# Patient Record
Sex: FEMALE | Race: WHITE | URBAN - METROPOLITAN AREA
[De-identification: names, ages, dates, MRNs, and addresses within clinical notes are randomized per-mention and may not be internally consistent; named-entity substitution may affect disease eponyms.]

---

## 2020-02-10 ENCOUNTER — OFFICE VISIT CONVERTED (OUTPATIENT)
Dept: PODIATRY | Facility: CLINIC | Age: 85
End: 2020-02-10
Attending: PODIATRIST

## 2020-04-06 ENCOUNTER — HOSPITAL ENCOUNTER (OUTPATIENT)
Dept: OTHER | Facility: HOSPITAL | Age: 85
Discharge: HOME | End: 2020-04-06
Attending: INTERNAL MEDICINE

## 2020-04-06 LAB
ALBUMIN SERPL-MCNC: 3.9 G/DL (ref 3.5–5)
ALBUMIN/GLOB SERPL: 1.2 {RATIO} (ref 1.4–2.6)
ALP SERPL-CCNC: 69 U/L (ref 43–160)
ALT SERPL-CCNC: 20 U/L (ref 10–40)
ANION GAP SERPL CALC-SCNC: 17 MMOL/L (ref 8–19)
AST SERPL-CCNC: 15 U/L (ref 15–50)
BASOPHILS # BLD AUTO: 0.08 10*3/UL (ref 0–0.2)
BASOPHILS NFR BLD AUTO: 0.6 % (ref 0–3)
BILIRUB SERPL-MCNC: 0.35 MG/DL (ref 0.2–1.3)
BUN SERPL-MCNC: 18 MG/DL (ref 5–25)
BUN/CREAT SERPL: 35 {RATIO} (ref 6–20)
CALCIUM SERPL-MCNC: 9.4 MG/DL (ref 8.7–10.4)
CHLORIDE SERPL-SCNC: 101 MMOL/L (ref 99–111)
CHOLEST SERPL-MCNC: 135 MG/DL (ref 107–200)
CHOLEST/HDLC SERPL: 5.4 {RATIO} (ref 3–6)
CONV ABS IMM GRAN: 0.08 10*3/UL (ref 0–0.2)
CONV CO2: 24 MMOL/L (ref 22–32)
CONV IMMATURE GRAN: 0.6 % (ref 0–1.8)
CONV TOTAL PROTEIN: 7.1 G/DL (ref 6.3–8.2)
CREAT UR-MCNC: 0.51 MG/DL (ref 0.5–0.9)
DEPRECATED RDW RBC AUTO: 44.9 FL (ref 36.4–46.3)
EOSINOPHIL # BLD AUTO: 0.21 10*3/UL (ref 0–0.7)
EOSINOPHIL # BLD AUTO: 1.6 % (ref 0–7)
ERYTHROCYTE [DISTWIDTH] IN BLOOD BY AUTOMATED COUNT: 13.5 % (ref 11.7–14.4)
EST. AVERAGE GLUCOSE BLD GHB EST-MCNC: 200 MG/DL
GFR SERPLBLD BASED ON 1.73 SQ M-ARVRAT: >60 ML/MIN/{1.73_M2}
GLOBULIN UR ELPH-MCNC: 3.2 G/DL (ref 2–3.5)
GLUCOSE SERPL-MCNC: 196 MG/DL (ref 65–99)
HBA1C MFR BLD: 8.6 % (ref 3.5–5.7)
HCT VFR BLD AUTO: 43.4 % (ref 37–47)
HDLC SERPL-MCNC: 25 MG/DL (ref 40–60)
HGB BLD-MCNC: 14 G/DL (ref 12–16)
LDLC SERPL CALC-MCNC: 45 MG/DL (ref 70–100)
LYMPHOCYTES # BLD AUTO: 5.33 10*3/UL (ref 1–5)
LYMPHOCYTES NFR BLD AUTO: 40.5 % (ref 20–45)
MCH RBC QN AUTO: 29.9 PG (ref 27–31)
MCHC RBC AUTO-ENTMCNC: 32.3 G/DL (ref 33–37)
MCV RBC AUTO: 92.5 FL (ref 81–99)
MONOCYTES # BLD AUTO: 1.05 10*3/UL (ref 0.2–1.2)
MONOCYTES NFR BLD AUTO: 8 % (ref 3–10)
NEUTROPHILS # BLD AUTO: 6.4 10*3/UL (ref 2–8)
NEUTROPHILS NFR BLD AUTO: 48.7 % (ref 30–85)
NRBC CBCN: 0 % (ref 0–0.7)
OSMOLALITY SERPL CALC.SUM OF ELEC: 293 MOSM/KG (ref 273–304)
PLATELET # BLD AUTO: 310 10*3/UL (ref 130–400)
PMV BLD AUTO: 10.4 FL (ref 9.4–12.3)
POTASSIUM SERPL-SCNC: 4.2 MMOL/L (ref 3.5–5.3)
RBC # BLD AUTO: 4.69 10*6/UL (ref 4.2–5.4)
SODIUM SERPL-SCNC: 138 MMOL/L (ref 135–147)
TRIGL SERPL-MCNC: 326 MG/DL (ref 40–150)
TSH SERPL-ACNC: 0.04 M[IU]/L (ref 0.27–4.2)
VLDLC SERPL-MCNC: 65 MG/DL (ref 5–37)
WBC # BLD AUTO: 13.15 10*3/UL (ref 4.8–10.8)

## 2021-05-15 VITALS
HEIGHT: 63 IN | SYSTOLIC BLOOD PRESSURE: 136 MMHG | DIASTOLIC BLOOD PRESSURE: 64 MMHG | BODY MASS INDEX: 24.63 KG/M2 | OXYGEN SATURATION: 93 % | WEIGHT: 139 LBS | HEART RATE: 66 BPM

## 2023-06-08 ENCOUNTER — HOSPITAL ENCOUNTER (EMERGENCY)
Facility: HOSPITAL | Age: 88
Discharge: HOME | End: 2023-06-08
Attending: EMERGENCY MEDICINE
Payer: MEDICARE

## 2023-06-08 ENCOUNTER — APPOINTMENT (OUTPATIENT)
Dept: GENERAL RADIOLOGY | Facility: HOSPITAL | Age: 88
End: 2023-06-08
Payer: MEDICARE

## 2023-06-08 ENCOUNTER — APPOINTMENT (OUTPATIENT)
Dept: CT IMAGING | Facility: HOSPITAL | Age: 88
End: 2023-06-08
Payer: MEDICARE

## 2023-06-08 VITALS
HEART RATE: 51 BPM | TEMPERATURE: 98 F | SYSTOLIC BLOOD PRESSURE: 168 MMHG | WEIGHT: 147.49 LBS | HEIGHT: 63 IN | RESPIRATION RATE: 24 BRPM | DIASTOLIC BLOOD PRESSURE: 67 MMHG | OXYGEN SATURATION: 96 % | BODY MASS INDEX: 26.13 KG/M2
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
## 2023-06-08 LAB
ALBUMIN SERPL-MCNC: 3.4 G/DL (ref 3.5–5.2)
ALBUMIN/GLOB SERPL: 1.2 G/DL
ALP SERPL-CCNC: 79 U/L (ref 39–117)
ALT SERPL W P-5'-P-CCNC: 10 U/L (ref 1–33)
ANION GAP SERPL CALCULATED.3IONS-SCNC: 9.1 MMOL/L (ref 5–15)
AST SERPL-CCNC: 13 U/L (ref 1–32)
BASOPHILS # BLD AUTO: 0.08 10*3/MM3 (ref 0–0.2)
BASOPHILS NFR BLD AUTO: 0.5 % (ref 0–1.5)
BILIRUB SERPL-MCNC: 0.4 MG/DL (ref 0–1.2)
BILIRUB UR QL STRIP: NEGATIVE
BUN SERPL-MCNC: 8 MG/DL (ref 8–23)
BUN/CREAT SERPL: 17 (ref 7–25)
CALCIUM SPEC-SCNC: 8.2 MG/DL (ref 8.2–9.6)
CHLORIDE SERPL-SCNC: 105 MMOL/L (ref 98–107)
CK SERPL-CCNC: 20 U/L (ref 20–180)
CLARITY UR: CLEAR
CO2 SERPL-SCNC: 27.9 MMOL/L (ref 22–29)
COLOR UR: YELLOW
CREAT SERPL-MCNC: 0.47 MG/DL (ref 0.57–1)
D-LACTATE SERPL-SCNC: 1.7 MMOL/L (ref 0.5–2)
DEPRECATED RDW RBC AUTO: 44.6 FL (ref 37–54)
EGFRCR SERPLBLD CKD-EPI 2021: 90 ML/MIN/1.73
EOSINOPHIL # BLD AUTO: 0.15 10*3/MM3 (ref 0–0.4)
EOSINOPHIL NFR BLD AUTO: 0.9 % (ref 0.3–6.2)
ERYTHROCYTE [DISTWIDTH] IN BLOOD BY AUTOMATED COUNT: 14.2 % (ref 12.3–15.4)
GLOBULIN UR ELPH-MCNC: 2.9 GM/DL
GLUCOSE SERPL-MCNC: 183 MG/DL (ref 65–99)
GLUCOSE UR STRIP-MCNC: ABNORMAL MG/DL
HCT VFR BLD AUTO: 44.7 % (ref 34–46.6)
HGB BLD-MCNC: 14.8 G/DL (ref 12–15.9)
HGB UR QL STRIP.AUTO: NEGATIVE
IMM GRANULOCYTES # BLD AUTO: 0.12 10*3/MM3 (ref 0–0.05)
IMM GRANULOCYTES NFR BLD AUTO: 0.7 % (ref 0–0.5)
KETONES UR QL STRIP: NEGATIVE
LEUKOCYTE ESTERASE UR QL STRIP.AUTO: NEGATIVE
LIPASE SERPL-CCNC: 16 U/L (ref 13–60)
LYMPHOCYTES # BLD AUTO: 3.82 10*3/MM3 (ref 0.7–3.1)
LYMPHOCYTES NFR BLD AUTO: 22.5 % (ref 19.6–45.3)
MAGNESIUM SERPL-MCNC: 1.7 MG/DL (ref 1.7–2.3)
MCH RBC QN AUTO: 28.6 PG (ref 26.6–33)
MCHC RBC AUTO-ENTMCNC: 33.1 G/DL (ref 31.5–35.7)
MCV RBC AUTO: 86.3 FL (ref 79–97)
MONOCYTES # BLD AUTO: 1.17 10*3/MM3 (ref 0.1–0.9)
MONOCYTES NFR BLD AUTO: 6.9 % (ref 5–12)
NEUTROPHILS NFR BLD AUTO: 11.65 10*3/MM3 (ref 1.7–7)
NEUTROPHILS NFR BLD AUTO: 68.5 % (ref 42.7–76)
NITRITE UR QL STRIP: NEGATIVE
NRBC BLD AUTO-RTO: 0 /100 WBC (ref 0–0.2)
PH UR STRIP.AUTO: 6.5 [PH] (ref 5–8)
PLATELET # BLD AUTO: 289 10*3/MM3 (ref 140–450)
PMV BLD AUTO: 10.1 FL (ref 6–12)
POTASSIUM SERPL-SCNC: 3.8 MMOL/L (ref 3.5–5.2)
PROT SERPL-MCNC: 6.3 G/DL (ref 6–8.5)
PROT UR QL STRIP: NEGATIVE
RBC # BLD AUTO: 5.18 10*6/MM3 (ref 3.77–5.28)
SODIUM SERPL-SCNC: 142 MMOL/L (ref 136–145)
SP GR UR STRIP: 1.01 (ref 1–1.03)
T4 FREE SERPL-MCNC: 1.38 NG/DL (ref 0.93–1.7)
TROPONIN T SERPL HS-MCNC: 8 NG/L
TSH SERPL DL<=0.05 MIU/L-ACNC: 0.14 UIU/ML (ref 0.27–4.2)
UROBILINOGEN UR QL STRIP: ABNORMAL
WBC NRBC COR # BLD: 16.99 10*3/MM3 (ref 3.4–10.8)

## 2023-06-08 PROCEDURE — 81003 URINALYSIS AUTO W/O SCOPE: CPT | Performed by: EMERGENCY MEDICINE

## 2023-06-08 PROCEDURE — 93010 ELECTROCARDIOGRAM REPORT: CPT | Performed by: INTERNAL MEDICINE

## 2023-06-08 PROCEDURE — 85025 COMPLETE CBC W/AUTO DIFF WBC: CPT | Performed by: EMERGENCY MEDICINE

## 2023-06-08 PROCEDURE — 84439 ASSAY OF FREE THYROXINE: CPT | Performed by: EMERGENCY MEDICINE

## 2023-06-08 PROCEDURE — 70450 CT HEAD/BRAIN W/O DYE: CPT

## 2023-06-08 PROCEDURE — 83690 ASSAY OF LIPASE: CPT | Performed by: EMERGENCY MEDICINE

## 2023-06-08 PROCEDURE — 93005 ELECTROCARDIOGRAM TRACING: CPT | Performed by: EMERGENCY MEDICINE

## 2023-06-08 PROCEDURE — 83735 ASSAY OF MAGNESIUM: CPT | Performed by: EMERGENCY MEDICINE

## 2023-06-08 PROCEDURE — 73502 X-RAY EXAM HIP UNI 2-3 VIEWS: CPT

## 2023-06-08 PROCEDURE — 84484 ASSAY OF TROPONIN QUANT: CPT | Performed by: EMERGENCY MEDICINE

## 2023-06-08 PROCEDURE — 80053 COMPREHEN METABOLIC PANEL: CPT | Performed by: EMERGENCY MEDICINE

## 2023-06-08 PROCEDURE — 99283 EMERGENCY DEPT VISIT LOW MDM: CPT

## 2023-06-08 PROCEDURE — 82550 ASSAY OF CK (CPK): CPT | Performed by: EMERGENCY MEDICINE

## 2023-06-08 PROCEDURE — 83605 ASSAY OF LACTIC ACID: CPT | Performed by: EMERGENCY MEDICINE

## 2023-06-08 PROCEDURE — 84443 ASSAY THYROID STIM HORMONE: CPT | Performed by: EMERGENCY MEDICINE

## 2023-06-08 PROCEDURE — 71045 X-RAY EXAM CHEST 1 VIEW: CPT

## 2023-06-08 PROCEDURE — 36415 COLL VENOUS BLD VENIPUNCTURE: CPT

## 2023-06-08 RX ORDER — APIXABAN 2.5 MG/1
TABLET, FILM COATED ORAL
COMMUNITY

## 2023-06-08 RX ORDER — LOSARTAN POTASSIUM 50 MG/1
TABLET ORAL
COMMUNITY

## 2023-06-08 RX ORDER — BUSPIRONE HYDROCHLORIDE 5 MG/1
TABLET ORAL
COMMUNITY

## 2023-06-08 RX ORDER — AMLODIPINE BESYLATE 5 MG/1
TABLET ORAL
COMMUNITY

## 2023-06-08 RX ORDER — HYDROXYZINE PAMOATE 50 MG/1
CAPSULE ORAL
COMMUNITY
Start: 2023-05-15

## 2023-06-08 RX ORDER — ROSUVASTATIN CALCIUM 10 MG/1
TABLET, COATED ORAL
COMMUNITY
Start: 2023-04-06

## 2023-06-08 RX ORDER — TRAZODONE HYDROCHLORIDE 100 MG/1
TABLET ORAL
COMMUNITY
Start: 2023-05-23

## 2023-06-08 RX ORDER — LEVOTHYROXINE SODIUM 75 MCG
TABLET ORAL
COMMUNITY

## 2023-06-08 RX ORDER — SITAGLIPTIN 100 MG/1
TABLET, FILM COATED ORAL
COMMUNITY

## 2023-06-08 RX ORDER — ESCITALOPRAM OXALATE 5 MG/1
TABLET ORAL
COMMUNITY
Start: 2023-05-15

## 2023-06-08 RX ADMIN — SODIUM CHLORIDE 1000 ML: 9 INJECTION, SOLUTION INTRAVENOUS at 13:54

## 2023-06-08 NOTE — ED PROVIDER NOTES
Time: 12:53 PM EDT  Date of encounter:  6/8/2023  Independent Historian/Clinical History and Information was obtained by:   Patient and Family  Chief Complaint: Fall    History is limited by: Dementia    History of Present Illness:  Patient is a 89 y.o. year old female who presents to the emergency department for evaluation of fall.  The patient reports a mechanical fall, however she is not sure if she  Passed out.  Patient denies headache and head injury.  Patient does report left hip pain.  Patient has had no nausea or vomiting or diarrhea.  The patient's daughter is here and states that it seems as though her dementia has gotten worse this week as she is seemed somewhat weaker and more forgetful this week.  The patient has had a history of UTIs in the past.  The patient's had no nausea, vomiting, or diarrhea.  Patient has had urinary frequency but does deny dysuria.  HPI    Patient Care Team  Primary Care Provider: Tony Nelson MD    Past Medical History:     No Known Allergies  Past Medical History:   Diagnosis Date    Arthritis     Dementia     Depression     Diabetes mellitus     Disease of thyroid gland     Hypertension      Past Surgical History:   Procedure Laterality Date    BACK SURGERY      HYSTERECTOMY       History reviewed. No pertinent family history.    Home Medications:  Prior to Admission medications    Medication Sig Start Date End Date Taking? Authorizing Provider   amLODIPine (NORVASC) 5 MG tablet amlodipine 5 mg oral tablet take 1 tablet (5 mg) by oral route once daily   Active   Yes ProviderNithya MD   busPIRone (BUSPAR) 5 MG tablet buspirone 5 mg oral tablet take 1 tablet (5 mg) by oral route 2 times per day   Active   Yes ProviderNithya MD   Eliquis 2.5 MG tablet tablet Eliquis 2.5 mg oral tablet take 1 tablet (2.5 mg) by oral route 2 times per day for 35 days   Active   Yes ProviderNithya MD   escitalopram (LEXAPRO) 5 MG tablet  5/15/23  Yes Nithya Murray MD  "  hydrOXYzine pamoate (VISTARIL) 50 MG capsule  5/15/23  Yes ProviderNithya MD   Januvia 100 MG tablet Januvia 100 mg oral tablet take 1 tablet (100 mg) by oral route once daily   Active   Yes ProviderNithya MD   losartan (COZAAR) 50 MG tablet losartan 50 mg oral tablet take 1 tablet (50 mg) by oral route 2 times per day   Active   Yes ProviderNithya MD   rosuvastatin (CRESTOR) 10 MG tablet  4/6/23  Yes ProviderNithya MD   Synthroid 75 MCG tablet Synthroid 75 mcg oral tablet take 1 tablet (75 mcg) by oral route once daily   Active   Yes ProviderNithya MD   traZODone (DESYREL) 100 MG tablet  5/23/23  Yes Provider, MD Nithya        Social History:   Social History     Tobacco Use    Smoking status: Never    Smokeless tobacco: Never   Vaping Use    Vaping Use: Never used   Substance Use Topics    Alcohol use: Never    Drug use: Never         Review of Systems:  Review of Systems   Constitutional:  Negative for chills and fever.   HENT:  Negative for congestion, rhinorrhea and sore throat.    Eyes:  Negative for pain and visual disturbance.   Respiratory:  Negative for apnea, cough, chest tightness and shortness of breath.    Cardiovascular:  Negative for chest pain and palpitations.   Gastrointestinal:  Negative for abdominal pain, diarrhea, nausea and vomiting.   Genitourinary:  Negative for difficulty urinating and dysuria.   Musculoskeletal:  Negative for joint swelling and myalgias.   Skin:  Negative for color change.   Neurological:  Positive for weakness. Negative for seizures and headaches.   Psychiatric/Behavioral: Negative.     All other systems reviewed and are negative.     Physical Exam:  /67   Pulse (!) 42   Temp 98 °F (36.7 °C) (Oral)   Resp 24   Ht 160 cm (63\")   Wt 66.9 kg (147 lb 7.8 oz)   SpO2 94%   BMI 26.13 kg/m²     Physical Exam  Vitals and nursing note reviewed.   Constitutional:       General: She is not in acute distress.     Appearance: " Normal appearance. She is not toxic-appearing.   HENT:      Head: Normocephalic and atraumatic.      Jaw: There is normal jaw occlusion.   Eyes:      General: Lids are normal.      Extraocular Movements: Extraocular movements intact.      Conjunctiva/sclera: Conjunctivae normal.      Pupils: Pupils are equal, round, and reactive to light.   Cardiovascular:      Rate and Rhythm: Normal rate and regular rhythm.      Pulses: Normal pulses.      Heart sounds: Normal heart sounds.   Pulmonary:      Effort: Pulmonary effort is normal. No respiratory distress.      Breath sounds: Normal breath sounds. No wheezing or rhonchi.   Abdominal:      General: Abdomen is flat.      Palpations: Abdomen is soft.      Tenderness: There is no abdominal tenderness. There is no guarding or rebound.   Musculoskeletal:         General: Normal range of motion.      Cervical back: Normal range of motion and neck supple.      Right lower leg: No edema.      Left lower leg: No edema.      Comments: (+) left hip tenderness   Skin:     General: Skin is warm and dry.   Neurological:      Mental Status: She is alert and oriented to person, place, and time. Mental status is at baseline.   Psychiatric:         Mood and Affect: Mood normal.                Procedures:  Procedures      Medical Decision Making:      The patient is resting comfortably and feels better, is alert, talkative and in no distress.  The patient´s CBC that was reviewed and interpreted by me shows no abnormalities of critical concern. Of note, there is no anemia requiring a blood transfusion and the platelet count is acceptable.  The patient´s CMP that was reviewed and interpretted by me shows no abnormalities of critical concern. Of note, the patient´s sodium and potassium are acceptable. The patient´s liver enzymes are unremarkable. The patient´s renal function (creatinine) is preserved. The patient has a normal anion gap.  Urinalysis is negative for bacteriuria.  Magnesium is  1.7.  CT head is negative for acute intracranial abnormalities.  Chest x-ray is consistent with atelectasis.  X-ray of the hip is negative for fracture.  The repeat examination is unremarkable and benign. The patient is neurologically intact, has a normal mental status and is at baseline per daughter at the bedside. The history, exam, diagnostic testing in the patient's current condition do not suggest meningitis, stroke, sepsis, subarachnoid hemorrhage, intracranial bleeding, encephalitis or other significant pathology that would warrant further testing, continued ED treatment, admission, neurological consultation, or other specialist evaluation at this point. The vital signs have been stable. The patient's condition is stable and appropriate for discharge. The patient will pursue further outpatient evaluation with the primary care physician or other designated or consulting position as indicated in the discharge instructions.      Comorbidities that affect care:    Dementia, Hypertension    External Notes reviewed:    Previous Clinic Note: Patient saw Dr. Rowland for diabetic foot evaluation.      The following orders were placed and all results were independently analyzed by me:  Orders Placed This Encounter   Procedures    XR Chest 1 View    CT Head Without Contrast    XR Hip With or Without Pelvis 2 - 3 View Left    Comprehensive Metabolic Panel    Urinalysis With Microscopic If Indicated (No Culture) - Urine, Clean Catch    Lipase    Single High Sensitivity Troponin T    TSH    T4, Free    CK    Lactic Acid, Plasma    Magnesium    CBC Auto Differential    Straight Cath    ECG 12 Lead Chest Pain    CBC & Differential       Medications Given in the Emergency Department:  Medications   sodium chloride 0.9 % bolus 1,000 mL (0 mL Intravenous Stopped 6/8/23 1650)        ED Course:         Labs:    Lab Results (last 24 hours)       Procedure Component Value Units Date/Time    Comprehensive Metabolic Panel  [[id number]]  (Abnormal) Collected: 06/08/23 1349    Specimen: Blood Updated: 06/08/23 1444     Glucose 183 mg/dL      BUN 8 mg/dL      Creatinine 0.47 mg/dL      Sodium 142 mmol/L      Potassium 3.8 mmol/L      Chloride 105 mmol/L      CO2 27.9 mmol/L      Calcium 8.2 mg/dL      Total Protein 6.3 g/dL      Albumin 3.4 g/dL      ALT (SGPT) 10 U/L      AST (SGOT) 13 U/L      Alkaline Phosphatase 79 U/L      Total Bilirubin 0.4 mg/dL      Globulin 2.9 gm/dL      A/G Ratio 1.2 g/dL      BUN/Creatinine Ratio 17.0     Anion Gap 9.1 mmol/L      eGFR 90.0 mL/min/1.73     Narrative:      GFR Normal >60  Chronic Kidney Disease <60  Kidney Failure <15    The GFR formula is only valid for adults with stable renal function between ages 18 and 70.    CBC & Differential [[id number]]  (Abnormal) Collected: 06/08/23 1349    Specimen: Blood Updated: 06/08/23 1409    Narrative:      The following orders were created for panel order CBC & Differential.  Procedure                               Abnormality         Status                     ---------                               -----------         ------                     CBC Auto Differential[[id number]]        Abnormal            Final result                 Please view results for these tests on the individual orders.    Lipase [[id number]]  (Normal) Collected: 06/08/23 1349    Specimen: Blood Updated: 06/08/23 1444     Lipase 16 U/L     Single High Sensitivity Troponin T [[id number]]  (Normal) Collected: 06/08/23 1349    Specimen: Blood Updated: 06/08/23 1444     HS Troponin T 8 ng/L     Narrative:      High Sensitive Troponin T Reference Range:  <10.0 ng/L- Negative Female for AMI  <15.0 ng/L- Negative Male for AMI  >=10 - Abnormal Female indicating possible myocardial injury.  >=15 - Abnormal Male indicating possible myocardial injury.   Clinicians would have to utilize clinical acumen, EKG, Troponin, and serial changes to determine if it is an Acute Myocardial Infarction or  myocardial injury due to an underlying chronic condition.         TSH [[id number]]  (Abnormal) Collected: 06/08/23 1349    Specimen: Blood Updated: 06/08/23 1444     TSH 0.135 uIU/mL     T4, Free [[id number]]  (Normal) Collected: 06/08/23 1349    Specimen: Blood Updated: 06/08/23 1444     Free T4 1.38 ng/dL     Narrative:      Results may be falsely increased if patient taking Biotin.      CK [[id number]]  (Normal) Collected: 06/08/23 1349    Specimen: Blood Updated: 06/08/23 1444     Creatine Kinase 20 U/L     Lactic Acid, Plasma [[id number]]  (Normal) Collected: 06/08/23 1349    Specimen: Blood Updated: 06/08/23 1426     Lactate 1.7 mmol/L     Magnesium [[id number]]  (Normal) Collected: 06/08/23 1349    Specimen: Blood Updated: 06/08/23 1444     Magnesium 1.7 mg/dL     CBC Auto Differential [[id number]]  (Abnormal) Collected: 06/08/23 1349    Specimen: Blood Updated: 06/08/23 1409     WBC 16.99 10*3/mm3      RBC 5.18 10*6/mm3      Hemoglobin 14.8 g/dL      Hematocrit 44.7 %      MCV 86.3 fL      MCH 28.6 pg      MCHC 33.1 g/dL      RDW 14.2 %      RDW-SD 44.6 fl      MPV 10.1 fL      Platelets 289 10*3/mm3      Neutrophil % 68.5 %      Lymphocyte % 22.5 %      Monocyte % 6.9 %      Eosinophil % 0.9 %      Basophil % 0.5 %      Immature Grans % 0.7 %      Neutrophils, Absolute 11.65 10*3/mm3      Lymphocytes, Absolute 3.82 10*3/mm3      Monocytes, Absolute 1.17 10*3/mm3      Eosinophils, Absolute 0.15 10*3/mm3      Basophils, Absolute 0.08 10*3/mm3      Immature Grans, Absolute 0.12 10*3/mm3      nRBC 0.0 /100 WBC     Urinalysis With Microscopic If Indicated (No Culture) - Urine, Clean Catch [[id number]]  (Abnormal) Collected: 06/08/23 1713    Specimen: Urine, Clean Catch Updated: 06/08/23 1720     Color, UA Yellow     Appearance, UA Clear     pH, UA 6.5     Specific Gravity, UA 1.012     Glucose,  mg/dL (2+)     Ketones, UA Negative     Bilirubin, UA Negative     Blood, UA Negative     Protein, UA Negative     Leuk  Esterase, UA Negative     Nitrite, UA Negative     Urobilinogen, UA 1.0 E.U./dL    Narrative:      Urine microscopic not indicated.             Imaging:    CT Head Without Contrast    Result Date: 6/8/2023  PROCEDURE: CT HEAD WO CONTRAST  COMPARISON:  [facility], CT, HEAD W/O CSPINE W/O CONTRAST, 5/27/2021, 17:56. INDICATIONS: headache,fell  PROTOCOL:   Standard imaging protocol performed    RADIATION:   DLP:1017.2mGy*cm   MA and/or KV was adjusted to minimize radiation dose.     TECHNIQUE: After obtaining the patient's consent, CT images were obtained without non-ionic intravenous contrast material.  FINDINGS:  There is mild motion limitation.  Brain:  No acute intracranial hemorrhage or mass effect is noted.  Mild diffuse atrophy noted.  No suspicious extra-axial fluid collection noted.  White matter changes compatible with small-vessel ischemic disease noted.  Remote lacunar infarcts versus prominent perivascular spaces within the subinsular aspect of the right temporal lobe again noted.  Orbits:  Orbits are grossly unremarkable and periorbital soft tissues appear grossly unremarkable.  Sinuses:  Paranasal sinuses are clear.  Mastoid air cells are clear.  Calvarium and soft tissues:  Bony calvarium is intact.  Soft tissues are grossly unremarkable in appearance.         1. Given mild motion limitation no acute intracranial abnormality 2. Chronic atrophy and white matter changes not unexpected for age     SOY ROSAS MD       Electronically Signed and Approved By: SOY ROSAS MD on 6/08/2023 at 15:02             XR Chest 1 View    Result Date: 6/8/2023  PROCEDURE: XR CHEST 1 VW  COMPARISON: [facility], CR, CHEST AP/PA 1 VIEW, 2/02/2020, 21:49.  INDICATIONS: cough/AMS  FINDINGS:  Study limited by overlying support and monitoring apparatus . Patient is rotated.  Left subclavian approach ICD in place.  The heart size appears stable given limitations of exam.  Increased hazy  interstitial and ground-glass opacities dependently are accentuated by low lung volumes.  Lungs are otherwise grossly clear.  Osseous structures demonstrate no acute abnormality        1. Increased hazy interstitial opacities favored to represent atelectasis.  A degree of mild underlying interstitial edema or pneumonia is felt to be less likely but cannot be excluded.       SOY ROSAS MD       Electronically Signed and Approved By: SOY ROSAS MD on 6/08/2023 at 14:23             XR Hip With or Without Pelvis 2 - 3 View Left    Result Date: 6/8/2023  PROCEDURE: XR HIP W OR WO PELVIS 2-3 VIEW LEFT  COMPARISON: None  INDICATIONS: BILATERAL HIP PAIN/AMS  FINDINGS:  There are degenerative changes of both hips.  There is bilateral SI joint arthritic change.  There is no evidence of an acute fracture.  There is no evidence of hip dislocation.       Degenerative changes of both hips.  No acute bony abnormality.      JUSTIN RADFORD MD       Electronically Signed and Approved By: JUSTIN RADFORD MD on 6/08/2023 at 14:12                Differential Diagnosis and Discussion:    Altered Mental Status: Based on the patient's signs and symptoms, differential diagnosis includes but is not limited to meningitis, stroke, sepsis, subarachnoid hemorrhage, intracranial bleeding, encephalitis, and metabolic encephalopathy.    All labs were reviewed and interpreted by me.  All X-rays impressions were independently interpreted by me.  EKG was interpreted by me.  CT scan radiology impression was interpreted by me.    MDM     Amount and/or Complexity of Data Reviewed  Clinical lab tests: reviewed  Tests in the radiology section of CPT®: reviewed  Tests in the medicine section of CPT®: reviewed             Patient Care Considerations:    I considered an MRI of the brain, however per daughter at the bedside patient is at baseline and the patient is able to move all extremities with no difficulty.      Consultants/Shared Management  Plan:    None    Social Determinants of Health:    Patient has presented with family members who are responsible, reliable and will ensure follow up care.      Disposition and Care Coordination:    Discharged: I considered escalation of care by admitting this patient for observation, however the patient has improved and is suitable and  stable for discharge.    I have explained the patient´s condition, diagnoses and treatment plan based on the information available to me at this time. I have answered questions and addressed any concerns. The patient has a good  understanding of the patient´s diagnosis, condition, and treatment plan as can be expected at this point. The vital signs have been stable. The patient´s condition is stable and appropriate for discharge from the emergency department.      The patient will pursue further outpatient evaluation with the primary care physician or other designated or consulting physician as outlined in the discharge instructions. They are agreeable to this plan of care and follow-up instructions have been explained in detail. The patient has received these instructions in written format and have expressed an understanding of the discharge instructions. The patient is aware that any significant change in condition or worsening of symptoms should prompt an immediate return to this or the closest emergency department or call to 911.  I have explained discharge medications and the need for follow up with the patient/caretakers. This was also printed in the discharge instructions. Patient was discharged with the following medications and follow up:      Medication List      No changes were made to your prescriptions during this visit.      Tony Nelson MD  [address] [facility] [place] [address]  [phone]             Final diagnoses:   Fall, initial encounter        ED Disposition       ED Disposition   Discharge    Condition   Stable    Comment   --               This medical  record created using voice recognition software.             Adriano Easton MD  06/08/23 0473

## 2023-06-08 NOTE — ED TRIAGE NOTES
Per family patient has had an ongoing cough and was going to be seen by PCP before fall. On arrival patient O2 sats were in low 90's, patient family denies any respiratory disease including COPD.

## 2023-06-09 LAB — QT INTERVAL: 450 MS

## 2023-06-29 PROBLEM — I82.409 DVT (DEEP VENOUS THROMBOSIS): Status: ACTIVE | Noted: 2023-06-29

## 2023-06-29 PROBLEM — E78.00 HIGH CHOLESTEROL: Status: ACTIVE | Noted: 2023-06-29

## 2023-06-29 PROBLEM — M19.90 ARTHRITIS: Status: ACTIVE | Noted: 2023-06-29

## 2023-06-29 PROBLEM — I63.9 STROKE: Status: ACTIVE | Noted: 2023-06-29

## 2023-06-29 PROBLEM — M21.619 BUNION: Status: ACTIVE | Noted: 2023-06-29

## 2023-06-29 PROBLEM — E11.9 DIABETES TYPE 2, CONTROLLED: Status: ACTIVE | Noted: 2023-06-29

## 2023-06-29 PROBLEM — L84 CORNS AND CALLOSITY: Status: ACTIVE | Noted: 2023-06-29

## 2023-06-29 PROBLEM — H40.9 GLAUCOMA: Status: ACTIVE | Noted: 2023-06-29

## 2023-06-29 PROBLEM — I10 HYPERTENSION: Status: ACTIVE | Noted: 2023-06-29

## 2023-07-05 PROBLEM — Z95.0 PACEMAKER: Status: ACTIVE | Noted: 2023-07-05

## 2023-07-05 PROBLEM — R00.1 BRADYCARDIA: Status: ACTIVE | Noted: 2023-07-05

## 2023-07-05 PROBLEM — I50.22 CHRONIC HFREF (HEART FAILURE WITH REDUCED EJECTION FRACTION): Status: ACTIVE | Noted: 2023-07-05

## 2023-07-05 NOTE — H&P (VIEW-ONLY)
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  The patient herself has had generalized weakness issues but no syncope or presyncopal episodes in the recent history.  She denies any chest pain or shortness of breath issues  Past Medical History:   Diagnosis Date    Arthritis     Dementia     Depression     Diabetes mellitus     Disease of thyroid gland     Hypertension          Current Outpatient Medications:     albuterol sulfate  (90 Base) MCG/ACT inhaler, Inhale 2 puffs Every 4 (Four) Hours As Needed., Disp: , Rfl:     amLODIPine (NORVASC) 5 MG tablet, amlodipine 5 mg oral tablet take 1 tablet (5 mg) by oral route once daily   Active, Disp: , Rfl:     busPIRone (BUSPAR) 5 MG tablet, buspirone 5 mg oral tablet take 1 tablet (5 mg) by oral route 2 times per day   Active, Disp: , Rfl:     Eliquis 2.5 MG tablet tablet, Eliquis 2.5 mg oral tablet take 1 tablet (2.5 mg) by oral route 2 times per day for 35 days   Active, Disp: , Rfl:     escitalopram (LEXAPRO) 5 MG tablet, , Disp: , Rfl:     hydrOXYzine pamoate (VISTARIL) 50 MG capsule, , Disp: , Rfl:     losartan (COZAAR) 50 MG tablet, losartan 50 mg oral tablet take 1 tablet (50 mg) by oral route 2 times per day   Active, Disp: , Rfl:     rosuvastatin (CRESTOR) 10 MG tablet, , Disp: , Rfl:     SITagliptin (JANUVIA) 100 MG tablet, Take 1 tablet by mouth Daily., Disp: , Rfl:     Synthroid 75 MCG tablet, Synthroid 75 mcg oral tablet take 1 tablet (75 mcg) by oral route once daily   Active, Disp: , Rfl:     traZODone (DESYREL) 100 MG tablet, , Disp: , Rfl:     vitamin D  "(ERGOCALCIFEROL) 1.25 MG (54428 UT) capsule capsule, Take 1 capsule by mouth Every 7 (Seven) Days., Disp: , Rfl:     Medications Discontinued During This Encounter   Medication Reason    Januvia 100 MG tablet *Therapy completed     Allergies   Allergen Reactions    Amoxicillin-Pot Clavulanate Hives    Azithromycin Hives    Erythromycin Hives        Social History     Tobacco Use    Smoking status: Never    Smokeless tobacco: Never   Vaping Use    Vaping Use: Never used   Substance Use Topics    Alcohol use: Never    Drug use: Never       History reviewed. No pertinent family history.     Objective     /57   Pulse 67   Ht 160 cm (63\")   BMI 26.13 kg/m²       Physical Exam    General Appearance:   no acute distress  Alert and oriented x3  HENT:   lips not cyanotic  Atraumatic  Neck:  No jvd   supple  Respiratory:  no respiratory distress  normal breath sounds  no rales  Cardiovascular:  Regular rate and rhythm  no S3, no S4   no murmur  no rub  Extremities  No cyanosis  lower extremity edema: none    Skin:   warm, dry  No rashes    Result Review :     No results found for: PROBNP  CMP          6/8/2023    13:49   CMP   Glucose 183    BUN 8    Creatinine 0.47    EGFR 90.0    Sodium 142    Potassium 3.8    Chloride 105    Calcium 8.2    Total Protein 6.3    Albumin 3.4    Globulin 2.9    Total Bilirubin 0.4    Alkaline Phosphatase 79    AST (SGOT) 13    ALT (SGPT) 10    Albumin/Globulin Ratio 1.2    BUN/Creatinine Ratio 17.0    Anion Gap 9.1      CBC w/diff          6/8/2023    13:49   CBC w/Diff   WBC 16.99    RBC 5.18    Hemoglobin 14.8    Hematocrit 44.7    MCV 86.3    MCH 28.6    MCHC 33.1    RDW 14.2    Platelets 289    Neutrophil Rel % 68.5    Immature Granulocyte Rel % 0.7    Lymphocyte Rel % 22.5    Monocyte Rel % 6.9    Eosinophil Rel % 0.9    Basophil Rel % 0.5       Lab Results   Component Value Date    TSH 0.135 (L) 06/08/2023      Lab Results   Component Value Date    FREET4 1.38 06/08/2023      No " results found for: DDIMERQUANT  Magnesium   Date Value Ref Range Status   06/08/2023 1.7 1.7 - 2.3 mg/dL Final      No results found for: DIGOXIN   Lab Results   Component Value Date    TROPONINT 8 06/08/2023      No results found for: POCTROP(                No results found for this or any previous visit.             The ASCVD Risk score ([place] WEINSTEIN, et al., 2019) failed to calculate for the following reasons:    The 2019 ASCVD risk score is only valid for ages 40 to 79    The patient has a prior MI or stroke diagnosis     Diagnoses and all orders for this visit:    1. Bradycardia (Primary)  Assessment & Plan:  Patient with previous symptomatic bradycardia possibly from sick sinus syndrome now with her device at EOL would recommend proceeding with a dual-chamber battery change after discussing risk benefits alternatives including the risk of bleeding and infection patient was agreeable.  Recommend holding Eliquis 3 days prior.      2. Chronic HFrEF (heart failure with reduced ejection fraction)  Assessment & Plan:  Patient with reduced mild ejection fraction of 40% would likely benefit from beta-blocker usage however will require battery change first.      3. Pacemaker            Follow Up     No follow-ups on file.          Patient was given instructions and counseling regarding her condition or for health maintenance advice. Please see specific information pulled into the AVS if appropriate.

## 2023-07-11 PROBLEM — Z45.010 PACEMAKER AT END OF BATTERY LIFE: Status: ACTIVE | Noted: 2023-07-11

## 2023-07-21 NOTE — PRE-PROCEDURE INSTRUCTIONS
PATIENT'S DAUGHTER INSTRUCTED FOR PATIENT TO BE:    - NPO AFTER MIDNIGHT EXCEPT CAN HAVE SIPS OF WATER WITH HIS MEDICATION PRIOR TO PROCEDURE    -  INSTRUCTED NO LOTIONS, JEWELRY, PIERCINGS, OR DEODORANT DAY OF THE PROCEDURE    - INSTRUCTED TO TAKE THE FOLLOWING MEDICATIONS THE DAY OF SURGERY:           INHALERS PRN, NORVASC, BUSPAR, LEXAPRO, VISTARIL, COZAAR, JANUVIA, SYNTHROID, VITAMINS     - INSTRUCTED PT TO FOLLOW ANY INSTRUCTIONS GIVEN BY HIS CARDIOLOGIST.       TO STOP ELIQUIS 2 DAYS PRIOR TO SURGERY    - INFORMED PT AN INCISION WILL BE MADE IN UPPER CHEST FOR PACEMAKER PLACEMENT. HE/ SHE WILL GO HOME IF HAVING A BATTERY CHANGE TO THE PACEMAKER. IF A NEW DEVICE/PACEMAKER IS PLACED, HE/SHE WILL STAY OVER NIGHT FOR OBSERVATION AND MONITORING.     - INFORMED THE PATIENT HE CAN HAVE TWO VISITORS WITH HIM THE DAY OF THE PROCEDURE    - INSTRUCTED PT TO PARK IN THE PARKING GARAGE, ENTER THE HOSPITAL THRU ENTRANCE A AND  CHECK IN AT THE MAIN REGISTRATION DESK, AFTER REGISTRATION PT WILL BE TAKEN THE THE PREOP AREA FOR HIS PROCEDURE.    -ARRIVAL TIME GIVEN BY CARDIOLOGIST OFFICE, INFORMED PT IF ARRIVAL TIME CHANGES OR ADJUSTMENTS NEED TO BE MADE IN THEIR ARRIVAL TIME, HE/SHE WOULD RECEIVE A CALL.    -INSTRUCTED PT TO COME TO [facility] TO GET THEIR LABS/ EKG DONE 48 HOURS PRIOR TO PROCEDURE      - PATIENT'S DAUGHTER VERBALIZED UNDERSTANDING

## 2023-07-25 ENCOUNTER — HOSPITAL ENCOUNTER (OUTPATIENT)
Facility: HOSPITAL | Age: 88
Setting detail: HOSPITAL OUTPATIENT SURGERY
Discharge: HOME | End: 2023-07-25
Attending: INTERNAL MEDICINE | Admitting: INTERNAL MEDICINE
Payer: MEDICARE

## 2023-07-25 VITALS
WEIGHT: 145 LBS | TEMPERATURE: 98.9 F | DIASTOLIC BLOOD PRESSURE: 61 MMHG | OXYGEN SATURATION: 93 % | SYSTOLIC BLOOD PRESSURE: 178 MMHG | RESPIRATION RATE: 16 BRPM | HEIGHT: 65 IN | HEART RATE: 60 BPM | BODY MASS INDEX: 24.16 KG/M2

## 2023-07-25 DIAGNOSIS — Z45.010 PACEMAKER AT END OF BATTERY LIFE: ICD-10-CM

## 2023-07-25 LAB
ANION GAP SERPL CALCULATED.3IONS-SCNC: 9.5 MMOL/L (ref 5–15)
BASOPHILS # BLD AUTO: 0.05 10*3/MM3 (ref 0–0.2)
BASOPHILS NFR BLD AUTO: 0.5 % (ref 0–1.5)
BUN SERPL-MCNC: 10 MG/DL (ref 8–23)
BUN/CREAT SERPL: 16.1 (ref 7–25)
CALCIUM SPEC-SCNC: 8.9 MG/DL (ref 8.2–9.6)
CHLORIDE SERPL-SCNC: 102 MMOL/L (ref 98–107)
CO2 SERPL-SCNC: 29.5 MMOL/L (ref 22–29)
CREAT SERPL-MCNC: 0.62 MG/DL (ref 0.57–1)
DEPRECATED RDW RBC AUTO: 45.8 FL (ref 37–54)
EGFRCR SERPLBLD CKD-EPI 2021: 84.2 ML/MIN/1.73
EOSINOPHIL # BLD AUTO: 0.17 10*3/MM3 (ref 0–0.4)
EOSINOPHIL NFR BLD AUTO: 1.6 % (ref 0.3–6.2)
ERYTHROCYTE [DISTWIDTH] IN BLOOD BY AUTOMATED COUNT: 14.4 % (ref 12.3–15.4)
GLUCOSE SERPL-MCNC: 181 MG/DL (ref 65–99)
HCT VFR BLD AUTO: 44.8 % (ref 34–46.6)
HGB BLD-MCNC: 14.9 G/DL (ref 12–15.9)
IMM GRANULOCYTES # BLD AUTO: 0.05 10*3/MM3 (ref 0–0.05)
IMM GRANULOCYTES NFR BLD AUTO: 0.5 % (ref 0–0.5)
INR PPP: 1.09 (ref 0.86–1.15)
LYMPHOCYTES # BLD AUTO: 2.66 10*3/MM3 (ref 0.7–3.1)
LYMPHOCYTES NFR BLD AUTO: 25 % (ref 19.6–45.3)
MCH RBC QN AUTO: 28.9 PG (ref 26.6–33)
MCHC RBC AUTO-ENTMCNC: 33.3 G/DL (ref 31.5–35.7)
MCV RBC AUTO: 86.8 FL (ref 79–97)
MONOCYTES # BLD AUTO: 0.85 10*3/MM3 (ref 0.1–0.9)
MONOCYTES NFR BLD AUTO: 8 % (ref 5–12)
NEUTROPHILS NFR BLD AUTO: 6.85 10*3/MM3 (ref 1.7–7)
NEUTROPHILS NFR BLD AUTO: 64.4 % (ref 42.7–76)
NRBC BLD AUTO-RTO: 0 /100 WBC (ref 0–0.2)
PLATELET # BLD AUTO: 285 10*3/MM3 (ref 140–450)
PMV BLD AUTO: 9.7 FL (ref 6–12)
POTASSIUM SERPL-SCNC: 4 MMOL/L (ref 3.5–5.2)
PROTHROMBIN TIME: 14.2 SECONDS (ref 11.8–14.9)
RBC # BLD AUTO: 5.16 10*6/MM3 (ref 3.77–5.28)
SODIUM SERPL-SCNC: 141 MMOL/L (ref 136–145)
WBC NRBC COR # BLD: 10.63 10*3/MM3 (ref 3.4–10.8)

## 2023-07-25 PROCEDURE — 33228 REMV&REPLC PM GEN DUAL LEAD: CPT | Performed by: INTERNAL MEDICINE

## 2023-07-25 PROCEDURE — 25010000002 VANCOMYCIN 5 G RECONSTITUTED SOLUTION: Performed by: INTERNAL MEDICINE

## 2023-07-25 PROCEDURE — 85610 PROTHROMBIN TIME: CPT | Performed by: NURSE PRACTITIONER

## 2023-07-25 PROCEDURE — 25010000002 MIDAZOLAM PER 1MG: Performed by: INTERNAL MEDICINE

## 2023-07-25 PROCEDURE — C1785 PMKR, DUAL, RATE-RESP: HCPCS | Performed by: INTERNAL MEDICINE

## 2023-07-25 PROCEDURE — 93010 ELECTROCARDIOGRAM REPORT: CPT | Performed by: INTERNAL MEDICINE

## 2023-07-25 PROCEDURE — 85025 COMPLETE CBC W/AUTO DIFF WBC: CPT | Performed by: NURSE PRACTITIONER

## 2023-07-25 PROCEDURE — 80048 BASIC METABOLIC PNL TOTAL CA: CPT | Performed by: NURSE PRACTITIONER

## 2023-07-25 PROCEDURE — 93005 ELECTROCARDIOGRAM TRACING: CPT | Performed by: NURSE PRACTITIONER

## 2023-07-25 PROCEDURE — 25010000002 BUPIVACAINE (PF) 0.5 % SOLUTION: Performed by: INTERNAL MEDICINE

## 2023-07-25 PROCEDURE — 25010000002 FENTANYL CITRATE (PF) 50 MCG/ML SOLUTION: Performed by: INTERNAL MEDICINE

## 2023-07-25 DEVICE — GEN PM AZURE XT SURESCAN DR MRI: Type: IMPLANTABLE DEVICE | Site: CHEST | Status: FUNCTIONAL

## 2023-07-25 RX ORDER — CLINDAMYCIN HYDROCHLORIDE 300 MG/1
600 CAPSULE ORAL 3 TIMES DAILY
Qty: 24 CAPSULE | Refills: 0 | Status: SHIPPED | OUTPATIENT
Start: 2023-07-25

## 2023-07-25 RX ORDER — LIDOCAINE HYDROCHLORIDE 20 MG/ML
INJECTION, SOLUTION INFILTRATION; PERINEURAL
Status: DISCONTINUED | OUTPATIENT
Start: 2023-07-25 | End: 2023-07-25 | Stop reason: HOSPADM

## 2023-07-25 RX ORDER — SODIUM CHLORIDE 0.9 % (FLUSH) 0.9 %
3 SYRINGE (ML) INJECTION EVERY 12 HOURS SCHEDULED
Status: DISCONTINUED | OUTPATIENT
Start: 2023-07-25 | End: 2023-07-25 | Stop reason: HOSPADM

## 2023-07-25 RX ORDER — BUPIVACAINE HYDROCHLORIDE 5 MG/ML
INJECTION, SOLUTION EPIDURAL; INTRACAUDAL
Status: DISCONTINUED | OUTPATIENT
Start: 2023-07-25 | End: 2023-07-25 | Stop reason: HOSPADM

## 2023-07-25 RX ORDER — SODIUM CHLORIDE 9 MG/ML
40 INJECTION, SOLUTION INTRAVENOUS AS NEEDED
Status: DISCONTINUED | OUTPATIENT
Start: 2023-07-25 | End: 2023-07-25 | Stop reason: SDUPTHER

## 2023-07-25 RX ORDER — ACETAMINOPHEN 325 MG/1
650 TABLET ORAL EVERY 4 HOURS PRN
Status: DISCONTINUED | OUTPATIENT
Start: 2023-07-25 | End: 2023-07-25 | Stop reason: HOSPADM

## 2023-07-25 RX ORDER — SODIUM CHLORIDE 9 MG/ML
40 INJECTION, SOLUTION INTRAVENOUS AS NEEDED
Status: DISCONTINUED | OUTPATIENT
Start: 2023-07-25 | End: 2023-07-25 | Stop reason: HOSPADM

## 2023-07-25 RX ORDER — MIDAZOLAM HYDROCHLORIDE 2 MG/2ML
INJECTION, SOLUTION INTRAMUSCULAR; INTRAVENOUS
Status: DISCONTINUED | OUTPATIENT
Start: 2023-07-25 | End: 2023-07-25 | Stop reason: HOSPADM

## 2023-07-25 RX ORDER — SODIUM CHLORIDE 0.9 % (FLUSH) 0.9 %
10 SYRINGE (ML) INJECTION AS NEEDED
Status: DISCONTINUED | OUTPATIENT
Start: 2023-07-25 | End: 2023-07-25 | Stop reason: HOSPADM

## 2023-07-25 RX ORDER — ONDANSETRON 2 MG/ML
4 INJECTION INTRAMUSCULAR; INTRAVENOUS EVERY 6 HOURS PRN
Status: DISCONTINUED | OUTPATIENT
Start: 2023-07-25 | End: 2023-07-25 | Stop reason: HOSPADM

## 2023-07-25 RX ORDER — SODIUM CHLORIDE 0.9 % (FLUSH) 0.9 %
10 SYRINGE (ML) INJECTION EVERY 12 HOURS SCHEDULED
Status: DISCONTINUED | OUTPATIENT
Start: 2023-07-25 | End: 2023-07-25 | Stop reason: HOSPADM

## 2023-07-25 RX ORDER — FENTANYL CITRATE 50 UG/ML
INJECTION, SOLUTION INTRAMUSCULAR; INTRAVENOUS
Status: DISCONTINUED | OUTPATIENT
Start: 2023-07-25 | End: 2023-07-25 | Stop reason: HOSPADM

## 2023-07-25 RX ORDER — ACETAMINOPHEN 650 MG/1
650 SUPPOSITORY RECTAL EVERY 4 HOURS PRN
Status: DISCONTINUED | OUTPATIENT
Start: 2023-07-25 | End: 2023-07-25 | Stop reason: HOSPADM

## 2023-07-25 RX ADMIN — Medication 1000 MG: at 11:29

## 2023-07-25 NOTE — NURSING NOTE
Patient came back from procedure stable. Patient recovered per order. Patient and family were educated on discharge instructions. Patient and family verbalized understanding. IV removed.

## 2023-07-25 NOTE — Clinical Note
Prepped: right chest. Prepped with: ChloraPrep. The site was clipped. The patient was draped in a sterile fashion.

## 2023-07-26 LAB — QT INTERVAL: 475 MS

## 2023-08-14 ENCOUNTER — CLINICAL SUPPORT NO REQUIREMENTS (OUTPATIENT)
Dept: CARDIOLOGY | Facility: CLINIC | Age: 88
End: 2023-08-14
Payer: MEDICARE

## 2023-08-14 DIAGNOSIS — R00.1 BRADYCARDIA: ICD-10-CM

## 2023-08-14 DIAGNOSIS — Z95.0 PACEMAKER: Primary | ICD-10-CM

## 2023-08-14 NOTE — PROGRESS NOTES
Normal Dual Chamber Pacemaker Device Interrogation and Device Testing.  Normal evaluation of device function and lead measurements.  No optimization was needed of parameters or maximization of device longevity.  Patient is following with Dr. Martin

## 2023-12-28 ENCOUNTER — TRANSCRIBE ORDERS (OUTPATIENT)
Dept: LAB | Facility: HOSPITAL | Age: 88
End: 2023-12-28
Payer: MEDICARE

## 2023-12-28 ENCOUNTER — LAB (OUTPATIENT)
Dept: LAB | Facility: HOSPITAL | Age: 88
End: 2023-12-28
Payer: MEDICARE

## 2023-12-28 DIAGNOSIS — R07.9 CHEST PAIN, UNSPECIFIED TYPE: Primary | ICD-10-CM

## 2023-12-28 DIAGNOSIS — R07.9 CHEST PAIN, UNSPECIFIED TYPE: ICD-10-CM

## 2023-12-28 PROCEDURE — 80053 COMPREHEN METABOLIC PANEL: CPT

## 2023-12-28 PROCEDURE — 36415 COLL VENOUS BLD VENIPUNCTURE: CPT

## 2023-12-28 PROCEDURE — 80061 LIPID PANEL: CPT

## 2023-12-29 LAB
ALBUMIN SERPL-MCNC: 4 G/DL (ref 3.5–5.2)
ALBUMIN/GLOB SERPL: 1.3 G/DL
ALP SERPL-CCNC: 69 U/L (ref 39–117)
ALT SERPL W P-5'-P-CCNC: 11 U/L (ref 1–33)
ANION GAP SERPL CALCULATED.3IONS-SCNC: 11 MMOL/L (ref 5–15)
AST SERPL-CCNC: 15 U/L (ref 1–32)
BILIRUB SERPL-MCNC: 0.4 MG/DL (ref 0–1.2)
BUN SERPL-MCNC: 7 MG/DL (ref 8–23)
BUN/CREAT SERPL: 10.8 (ref 7–25)
CALCIUM SPEC-SCNC: 9.5 MG/DL (ref 8.2–9.6)
CHLORIDE SERPL-SCNC: 100 MMOL/L (ref 98–107)
CHOLEST SERPL-MCNC: 132 MG/DL (ref 0–200)
CO2 SERPL-SCNC: 28 MMOL/L (ref 22–29)
CREAT SERPL-MCNC: 0.65 MG/DL (ref 0.57–1)
EGFRCR SERPLBLD CKD-EPI 2021: 83.2 ML/MIN/1.73
GLOBULIN UR ELPH-MCNC: 3.2 GM/DL
GLUCOSE SERPL-MCNC: 172 MG/DL (ref 65–99)
HDLC SERPL-MCNC: 33 MG/DL (ref 40–60)
LDLC SERPL CALC-MCNC: 73 MG/DL (ref 0–100)
LDLC/HDLC SERPL: 2.09 {RATIO}
POTASSIUM SERPL-SCNC: 3.8 MMOL/L (ref 3.5–5.2)
PROT SERPL-MCNC: 7.2 G/DL (ref 6–8.5)
SODIUM SERPL-SCNC: 139 MMOL/L (ref 136–145)
TRIGL SERPL-MCNC: 150 MG/DL (ref 0–150)
VLDLC SERPL-MCNC: 26 MG/DL (ref 5–40)

## 2024-04-24 ENCOUNTER — APPOINTMENT (OUTPATIENT)
Dept: CT IMAGING | Facility: HOSPITAL | Age: 89
DRG: 193 | End: 2024-04-24
Payer: MEDICARE

## 2024-04-24 ENCOUNTER — HOSPITAL ENCOUNTER (INPATIENT)
Facility: HOSPITAL | Age: 89
LOS: 5 days | Discharge: NURSING, REHAB OR LONG-TERM CARE | DRG: 193 | End: 2024-04-29
Attending: EMERGENCY MEDICINE | Admitting: INTERNAL MEDICINE
Payer: MEDICARE

## 2024-04-24 ENCOUNTER — APPOINTMENT (OUTPATIENT)
Dept: GENERAL RADIOLOGY | Facility: HOSPITAL | Age: 89
DRG: 193 | End: 2024-04-24
Payer: MEDICARE

## 2024-04-24 DIAGNOSIS — G93.40 ENCEPHALOPATHY: Primary | ICD-10-CM

## 2024-04-24 DIAGNOSIS — R26.2 DIFFICULTY WALKING: ICD-10-CM

## 2024-04-24 DIAGNOSIS — J47.9 BRONCHIECTASIS WITHOUT COMPLICATION: ICD-10-CM

## 2024-04-24 DIAGNOSIS — D72.829 LEUKOCYTOSIS, UNSPECIFIED TYPE: ICD-10-CM

## 2024-04-24 DIAGNOSIS — E86.0 DEHYDRATION: ICD-10-CM

## 2024-04-24 DIAGNOSIS — J18.9 PNEUMONIA OF BOTH LUNGS DUE TO INFECTIOUS ORGANISM, UNSPECIFIED PART OF LUNG: ICD-10-CM

## 2024-04-24 PROBLEM — R41.82 ALTERED MENTAL STATUS: Status: ACTIVE | Noted: 2024-04-24

## 2024-04-24 PROBLEM — N17.9 ACUTE KIDNEY INJURY: Status: ACTIVE | Noted: 2024-04-24

## 2024-04-24 LAB
ABO GROUP BLD: NORMAL
ABO GROUP BLD: NORMAL
ALBUMIN SERPL-MCNC: 3.5 G/DL (ref 3.5–5.2)
ALBUMIN/GLOB SERPL: 1.1 G/DL
ALP SERPL-CCNC: 53 U/L (ref 39–117)
ALT SERPL W P-5'-P-CCNC: 8 U/L (ref 1–33)
AMMONIA BLD-SCNC: 13 UMOL/L (ref 11–51)
ANION GAP SERPL CALCULATED.3IONS-SCNC: 14.9 MMOL/L (ref 5–15)
APTT PPP: 25.4 SECONDS (ref 24.2–34.2)
AST SERPL-CCNC: 15 U/L (ref 1–32)
BACTERIA UR QL AUTO: ABNORMAL /HPF
BILIRUB SERPL-MCNC: 0.4 MG/DL (ref 0–1.2)
BILIRUB UR QL STRIP: NEGATIVE
BLD GP AB SCN SERPL QL: NEGATIVE
BUN SERPL-MCNC: 51 MG/DL (ref 8–23)
BUN/CREAT SERPL: 45.1 (ref 7–25)
CALCIUM SPEC-SCNC: 9 MG/DL (ref 8.2–9.6)
CHLORIDE SERPL-SCNC: 99 MMOL/L (ref 98–107)
CLARITY UR: CLEAR
CO2 SERPL-SCNC: 21.1 MMOL/L (ref 22–29)
COLOR UR: YELLOW
CREAT SERPL-MCNC: 1.13 MG/DL (ref 0.57–1)
D-LACTATE SERPL-SCNC: 1.5 MMOL/L (ref 0.5–2)
DEPRECATED RDW RBC AUTO: 44.1 FL (ref 37–54)
EGFRCR SERPLBLD CKD-EPI 2021: 45.7 ML/MIN/1.73
EOSINOPHIL # BLD MANUAL: 0.48 10*3/MM3 (ref 0–0.4)
EOSINOPHIL NFR BLD MANUAL: 2 % (ref 0.3–6.2)
ERYTHROCYTE [DISTWIDTH] IN BLOOD BY AUTOMATED COUNT: 14.6 % (ref 12.3–15.4)
FLUAV SUBTYP SPEC NAA+PROBE: NOT DETECTED
FLUBV RNA ISLT QL NAA+PROBE: NOT DETECTED
GLOBULIN UR ELPH-MCNC: 3.2 GM/DL
GLUCOSE SERPL-MCNC: 177 MG/DL (ref 65–99)
GLUCOSE UR STRIP-MCNC: NEGATIVE MG/DL
HCT VFR BLD AUTO: 41 % (ref 34–46.6)
HGB BLD-MCNC: 13.6 G/DL (ref 12–15.9)
HGB UR QL STRIP.AUTO: NEGATIVE
HOLD SPECIMEN: NORMAL
HOLD SPECIMEN: NORMAL
HYALINE CASTS UR QL AUTO: ABNORMAL /LPF
INR PPP: 1.23 (ref 0.86–1.15)
KETONES UR QL STRIP: NEGATIVE
L PNEUMO1 AG UR QL IA: NEGATIVE
LEUKOCYTE ESTERASE UR QL STRIP.AUTO: ABNORMAL
LYMPHOCYTES # BLD MANUAL: 6.18 10*3/MM3 (ref 0.7–3.1)
LYMPHOCYTES NFR BLD MANUAL: 7 % (ref 5–12)
M PNEUMO IGM SER QL: POSITIVE
MAGNESIUM SERPL-MCNC: 2 MG/DL (ref 1.7–2.3)
MCH RBC QN AUTO: 27.6 PG (ref 26.6–33)
MCHC RBC AUTO-ENTMCNC: 33.2 G/DL (ref 31.5–35.7)
MCV RBC AUTO: 83.3 FL (ref 79–97)
MONOCYTES # BLD: 1.66 10*3/MM3 (ref 0.1–0.9)
NEUTROPHILS # BLD AUTO: 15.46 10*3/MM3 (ref 1.7–7)
NEUTROPHILS NFR BLD MANUAL: 59 % (ref 42.7–76)
NEUTS BAND NFR BLD MANUAL: 6 % (ref 0–5)
NITRITE UR QL STRIP: NEGATIVE
PH UR STRIP.AUTO: 6 [PH] (ref 5–8)
PLATELET # BLD AUTO: 406 10*3/MM3 (ref 140–450)
PMV BLD AUTO: 10.4 FL (ref 6–12)
POTASSIUM SERPL-SCNC: 3.4 MMOL/L (ref 3.5–5.2)
PROCALCITONIN SERPL-MCNC: 0.08 NG/ML (ref 0–0.25)
PROT SERPL-MCNC: 6.7 G/DL (ref 6–8.5)
PROT UR QL STRIP: ABNORMAL
PROTHROMBIN TIME: 15.7 SECONDS (ref 11.8–14.9)
QT INTERVAL: 453 MS
QT INTERVAL: 456 MS
QTC INTERVAL: 458 MS
QTC INTERVAL: 462 MS
RBC # BLD AUTO: 4.92 10*6/MM3 (ref 3.77–5.28)
RBC # UR STRIP: ABNORMAL /HPF
RBC MORPH BLD: NORMAL
REF LAB TEST METHOD: ABNORMAL
RH BLD: NEGATIVE
RH BLD: NEGATIVE
RSV RNA NPH QL NAA+NON-PROBE: NOT DETECTED
S PNEUM AG SPEC QL LA: NEGATIVE
SARS-COV-2 RNA RESP QL NAA+PROBE: NOT DETECTED
SMALL PLATELETS BLD QL SMEAR: ADEQUATE
SODIUM SERPL-SCNC: 135 MMOL/L (ref 136–145)
SP GR UR STRIP: 1.02 (ref 1–1.03)
SQUAMOUS #/AREA URNS HPF: ABNORMAL /HPF
T&S EXPIRATION DATE: NORMAL
T4 FREE SERPL-MCNC: 1.61 NG/DL (ref 0.92–1.68)
TROPONIN T SERPL HS-MCNC: 19 NG/L
TSH SERPL DL<=0.05 MIU/L-ACNC: 0.07 UIU/ML (ref 0.27–4.2)
UROBILINOGEN UR QL STRIP: ABNORMAL
VARIANT LYMPHS NFR BLD MANUAL: 2 % (ref 0–5)
VARIANT LYMPHS NFR BLD MANUAL: 24 % (ref 19.6–45.3)
WBC # UR STRIP: ABNORMAL /HPF
WBC MORPH BLD: NORMAL
WBC NRBC COR # BLD AUTO: 23.78 10*3/MM3 (ref 3.4–10.8)
WHOLE BLOOD HOLD COAG: NORMAL
WHOLE BLOOD HOLD SPECIMEN: NORMAL

## 2024-04-24 PROCEDURE — 71250 CT THORAX DX C-: CPT

## 2024-04-24 PROCEDURE — 85007 BL SMEAR W/DIFF WBC COUNT: CPT | Performed by: EMERGENCY MEDICINE

## 2024-04-24 PROCEDURE — 83735 ASSAY OF MAGNESIUM: CPT | Performed by: EMERGENCY MEDICINE

## 2024-04-24 PROCEDURE — 85730 THROMBOPLASTIN TIME PARTIAL: CPT | Performed by: EMERGENCY MEDICINE

## 2024-04-24 PROCEDURE — 87899 AGENT NOS ASSAY W/OPTIC: CPT | Performed by: INTERNAL MEDICINE

## 2024-04-24 PROCEDURE — 82140 ASSAY OF AMMONIA: CPT | Performed by: EMERGENCY MEDICINE

## 2024-04-24 PROCEDURE — 86738 MYCOPLASMA ANTIBODY: CPT | Performed by: INTERNAL MEDICINE

## 2024-04-24 PROCEDURE — 99222 1ST HOSP IP/OBS MODERATE 55: CPT | Performed by: STUDENT IN AN ORGANIZED HEALTH CARE EDUCATION/TRAINING PROGRAM

## 2024-04-24 PROCEDURE — 85610 PROTHROMBIN TIME: CPT | Performed by: EMERGENCY MEDICINE

## 2024-04-24 PROCEDURE — 25810000003 SODIUM CHLORIDE 0.9 % SOLUTION: Performed by: EMERGENCY MEDICINE

## 2024-04-24 PROCEDURE — 94799 UNLISTED PULMONARY SVC/PX: CPT

## 2024-04-24 PROCEDURE — 36415 COLL VENOUS BLD VENIPUNCTURE: CPT

## 2024-04-24 PROCEDURE — 94640 AIRWAY INHALATION TREATMENT: CPT

## 2024-04-24 PROCEDURE — 86850 RBC ANTIBODY SCREEN: CPT | Performed by: EMERGENCY MEDICINE

## 2024-04-24 PROCEDURE — 70450 CT HEAD/BRAIN W/O DYE: CPT

## 2024-04-24 PROCEDURE — 84443 ASSAY THYROID STIM HORMONE: CPT | Performed by: EMERGENCY MEDICINE

## 2024-04-24 PROCEDURE — 87449 NOS EACH ORGANISM AG IA: CPT | Performed by: INTERNAL MEDICINE

## 2024-04-24 PROCEDURE — 84439 ASSAY OF FREE THYROXINE: CPT | Performed by: EMERGENCY MEDICINE

## 2024-04-24 PROCEDURE — 74176 CT ABD & PELVIS W/O CONTRAST: CPT

## 2024-04-24 PROCEDURE — 86900 BLOOD TYPING SEROLOGIC ABO: CPT

## 2024-04-24 PROCEDURE — 87637 SARSCOV2&INF A&B&RSV AMP PRB: CPT | Performed by: EMERGENCY MEDICINE

## 2024-04-24 PROCEDURE — 99285 EMERGENCY DEPT VISIT HI MDM: CPT

## 2024-04-24 PROCEDURE — 84145 PROCALCITONIN (PCT): CPT | Performed by: INTERNAL MEDICINE

## 2024-04-24 PROCEDURE — 80053 COMPREHEN METABOLIC PANEL: CPT | Performed by: EMERGENCY MEDICINE

## 2024-04-24 PROCEDURE — 93005 ELECTROCARDIOGRAM TRACING: CPT | Performed by: EMERGENCY MEDICINE

## 2024-04-24 PROCEDURE — 82948 REAGENT STRIP/BLOOD GLUCOSE: CPT

## 2024-04-24 PROCEDURE — 71045 X-RAY EXAM CHEST 1 VIEW: CPT

## 2024-04-24 PROCEDURE — 87040 BLOOD CULTURE FOR BACTERIA: CPT | Performed by: EMERGENCY MEDICINE

## 2024-04-24 PROCEDURE — 85025 COMPLETE CBC W/AUTO DIFF WBC: CPT | Performed by: EMERGENCY MEDICINE

## 2024-04-24 PROCEDURE — 86900 BLOOD TYPING SEROLOGIC ABO: CPT | Performed by: EMERGENCY MEDICINE

## 2024-04-24 PROCEDURE — 36415 COLL VENOUS BLD VENIPUNCTURE: CPT | Performed by: EMERGENCY MEDICINE

## 2024-04-24 PROCEDURE — 25010000002 CEFTRIAXONE PER 250 MG: Performed by: EMERGENCY MEDICINE

## 2024-04-24 PROCEDURE — 86901 BLOOD TYPING SEROLOGIC RH(D): CPT | Performed by: EMERGENCY MEDICINE

## 2024-04-24 PROCEDURE — 81001 URINALYSIS AUTO W/SCOPE: CPT | Performed by: EMERGENCY MEDICINE

## 2024-04-24 PROCEDURE — 86901 BLOOD TYPING SEROLOGIC RH(D): CPT

## 2024-04-24 PROCEDURE — 87086 URINE CULTURE/COLONY COUNT: CPT | Performed by: INTERNAL MEDICINE

## 2024-04-24 PROCEDURE — 83605 ASSAY OF LACTIC ACID: CPT | Performed by: EMERGENCY MEDICINE

## 2024-04-24 PROCEDURE — 84484 ASSAY OF TROPONIN QUANT: CPT | Performed by: EMERGENCY MEDICINE

## 2024-04-24 RX ORDER — AMOXICILLIN 250 MG
2 CAPSULE ORAL 2 TIMES DAILY PRN
Status: DISCONTINUED | OUTPATIENT
Start: 2024-04-24 | End: 2024-04-30 | Stop reason: HOSPADM

## 2024-04-24 RX ORDER — LEVOTHYROXINE SODIUM 88 UG/1
88 TABLET ORAL
Status: DISCONTINUED | OUTPATIENT
Start: 2024-04-25 | End: 2024-04-30 | Stop reason: HOSPADM

## 2024-04-24 RX ORDER — FLUTICASONE PROPIONATE 50 MCG
1 SPRAY, SUSPENSION (ML) NASAL DAILY PRN
COMMUNITY
Start: 2024-04-04

## 2024-04-24 RX ORDER — DEXTROSE MONOHYDRATE, SODIUM CHLORIDE, AND POTASSIUM CHLORIDE 50; 1.49; 9 G/1000ML; G/1000ML; G/1000ML
75 INJECTION, SOLUTION INTRAVENOUS CONTINUOUS
Status: DISCONTINUED | OUTPATIENT
Start: 2024-04-24 | End: 2024-04-28

## 2024-04-24 RX ORDER — SODIUM CHLORIDE 0.9 % (FLUSH) 0.9 %
10 SYRINGE (ML) INJECTION AS NEEDED
Status: DISCONTINUED | OUTPATIENT
Start: 2024-04-24 | End: 2024-04-30 | Stop reason: HOSPADM

## 2024-04-24 RX ORDER — NALOXONE HCL 0.4 MG/ML
0.4 VIAL (ML) INJECTION
Status: DISCONTINUED | OUTPATIENT
Start: 2024-04-24 | End: 2024-04-30 | Stop reason: HOSPADM

## 2024-04-24 RX ORDER — NITROGLYCERIN 0.4 MG/1
0.4 TABLET SUBLINGUAL
Status: DISCONTINUED | OUTPATIENT
Start: 2024-04-24 | End: 2024-04-30 | Stop reason: HOSPADM

## 2024-04-24 RX ORDER — IPRATROPIUM BROMIDE AND ALBUTEROL SULFATE 2.5; .5 MG/3ML; MG/3ML
3 SOLUTION RESPIRATORY (INHALATION) ONCE
Status: COMPLETED | OUTPATIENT
Start: 2024-04-24 | End: 2024-04-24

## 2024-04-24 RX ORDER — SODIUM CHLORIDE 9 MG/ML
40 INJECTION, SOLUTION INTRAVENOUS AS NEEDED
Status: DISCONTINUED | OUTPATIENT
Start: 2024-04-24 | End: 2024-04-30 | Stop reason: HOSPADM

## 2024-04-24 RX ORDER — TRAZODONE HYDROCHLORIDE 100 MG/1
100 TABLET ORAL NIGHTLY
Status: DISCONTINUED | OUTPATIENT
Start: 2024-04-24 | End: 2024-04-30 | Stop reason: HOSPADM

## 2024-04-24 RX ORDER — TEMAZEPAM 15 MG/1
15 CAPSULE ORAL NIGHTLY PRN
Status: DISCONTINUED | OUTPATIENT
Start: 2024-04-24 | End: 2024-04-30 | Stop reason: HOSPADM

## 2024-04-24 RX ORDER — MORPHINE SULFATE 2 MG/ML
2 INJECTION, SOLUTION INTRAMUSCULAR; INTRAVENOUS
Status: ACTIVE | OUTPATIENT
Start: 2024-04-24 | End: 2024-04-27

## 2024-04-24 RX ORDER — ALUMINA, MAGNESIA, AND SIMETHICONE 2400; 2400; 240 MG/30ML; MG/30ML; MG/30ML
15 SUSPENSION ORAL EVERY 6 HOURS PRN
Status: DISCONTINUED | OUTPATIENT
Start: 2024-04-24 | End: 2024-04-25

## 2024-04-24 RX ORDER — BISACODYL 10 MG
10 SUPPOSITORY, RECTAL RECTAL DAILY PRN
Status: DISCONTINUED | OUTPATIENT
Start: 2024-04-24 | End: 2024-04-30 | Stop reason: HOSPADM

## 2024-04-24 RX ORDER — POLYETHYLENE GLYCOL 3350 17 G/17G
17 POWDER, FOR SOLUTION ORAL DAILY PRN
Status: DISCONTINUED | OUTPATIENT
Start: 2024-04-24 | End: 2024-04-30 | Stop reason: HOSPADM

## 2024-04-24 RX ORDER — ACETAMINOPHEN 325 MG/1
650 TABLET ORAL EVERY 4 HOURS PRN
Status: DISCONTINUED | OUTPATIENT
Start: 2024-04-24 | End: 2024-04-30 | Stop reason: HOSPADM

## 2024-04-24 RX ORDER — HYDROCODONE BITARTRATE AND ACETAMINOPHEN 5; 325 MG/1; MG/1
1 TABLET ORAL EVERY 4 HOURS PRN
Status: DISCONTINUED | OUTPATIENT
Start: 2024-04-24 | End: 2024-04-30 | Stop reason: HOSPADM

## 2024-04-24 RX ORDER — FAMOTIDINE 20 MG/1
20 TABLET, FILM COATED ORAL DAILY
Status: DISCONTINUED | OUTPATIENT
Start: 2024-04-24 | End: 2024-04-30 | Stop reason: HOSPADM

## 2024-04-24 RX ORDER — BISACODYL 5 MG/1
5 TABLET, DELAYED RELEASE ORAL DAILY PRN
Status: DISCONTINUED | OUTPATIENT
Start: 2024-04-24 | End: 2024-04-30 | Stop reason: HOSPADM

## 2024-04-24 RX ORDER — ALPRAZOLAM 0.25 MG/1
0.25 TABLET ORAL EVERY 6 HOURS PRN
Status: DISCONTINUED | OUTPATIENT
Start: 2024-04-24 | End: 2024-04-30 | Stop reason: HOSPADM

## 2024-04-24 RX ORDER — ONDANSETRON 2 MG/ML
4 INJECTION INTRAMUSCULAR; INTRAVENOUS EVERY 4 HOURS PRN
Status: DISCONTINUED | OUTPATIENT
Start: 2024-04-24 | End: 2024-04-30 | Stop reason: HOSPADM

## 2024-04-24 RX ORDER — SODIUM CHLORIDE 0.9 % (FLUSH) 0.9 %
10 SYRINGE (ML) INJECTION EVERY 12 HOURS SCHEDULED
Status: DISCONTINUED | OUTPATIENT
Start: 2024-04-24 | End: 2024-04-30 | Stop reason: HOSPADM

## 2024-04-24 RX ADMIN — DOXYCYCLINE 100 MG: 100 INJECTION, POWDER, LYOPHILIZED, FOR SOLUTION INTRAVENOUS at 15:17

## 2024-04-24 RX ADMIN — POTASSIUM CHLORIDE, DEXTROSE MONOHYDRATE AND SODIUM CHLORIDE 75 ML/HR: 150; 5; 900 INJECTION, SOLUTION INTRAVENOUS at 21:47

## 2024-04-24 RX ADMIN — APIXABAN 2.5 MG: 2.5 TABLET, FILM COATED ORAL at 20:47

## 2024-04-24 RX ADMIN — LINAGLIPTIN 5 MG: 5 TABLET, FILM COATED ORAL at 17:41

## 2024-04-24 RX ADMIN — Medication 10 ML: at 20:47

## 2024-04-24 RX ADMIN — CEFTRIAXONE SODIUM 1000 MG: 1 INJECTION, POWDER, FOR SOLUTION INTRAMUSCULAR; INTRAVENOUS at 14:37

## 2024-04-24 RX ADMIN — IPRATROPIUM BROMIDE AND ALBUTEROL SULFATE 3 ML: .5; 3 SOLUTION RESPIRATORY (INHALATION) at 12:37

## 2024-04-24 RX ADMIN — TRAZODONE HYDROCHLORIDE 100 MG: 100 TABLET ORAL at 20:47

## 2024-04-24 RX ADMIN — FAMOTIDINE 20 MG: 20 TABLET ORAL at 17:41

## 2024-04-24 RX ADMIN — SODIUM CHLORIDE 500 ML: 9 INJECTION, SOLUTION INTRAVENOUS at 12:21

## 2024-04-24 NOTE — PLAN OF CARE
Problem: Adult Inpatient Plan of Care  Goal: Plan of Care Review  Outcome: Ongoing, Progressing  Goal: Patient-Specific Goal (Individualized)  Outcome: Ongoing, Progressing  Goal: Absence of Hospital-Acquired Illness or Injury  Outcome: Ongoing, Progressing  Intervention: Identify and Manage Fall Risk  Intervention: Prevent and Manage VTE (Venous Thromboembolism) Risk  Intervention: Prevent Infection  Goal: Optimal Comfort and Wellbeing  Outcome: Ongoing, Progressing  Intervention: Provide Person-Centered Care  Goal: Readiness for Transition of Care  Outcome: Ongoing, Progressing  Intervention: Mutually Develop Transition Plan     Problem: Skin Injury Risk Increased  Goal: Skin Health and Integrity  Outcome: Ongoing, Progressing   Goal Outcome Evaluation:   Patient admit from ER, arrived on 3W at approximately 1605, alert to name only, VSS, multiple wounds added to avatar, wound care consult ordered per protocol, will continue to monitor.

## 2024-04-24 NOTE — CONSULTS
[facility]  [facility]      Patient Name:   Shani Burnett  YOB: 1932  Identification Number:   MRN - [id number]  Date of Service:   04/24/2024 09:27:15    Diagnosis:        R41.0 - Disorientation, unspecified    Impression:       This is a 89 year old female with dementia, HTN, HLD, who takes Eliquis BID, who presents to the emergency department at [place], [place] by EMS for complaints of confusion.      I called the daughter by phone but could not reach anyone.      Family told EMS that she awoke normal and became confused around 8-8:30 AM.      On exam she is oriented to self but not age or month. She is easily distractible and groans in pain. She is able to follow commands and lift all extremities. She is able to identify items on stroke cards without aphasia.      With lack of focality or aphasia, stroke is less suspected at this time. Would consider delirium secondary to infectious/metabolic process or other, compounded by underlying dementia. She is not a tPA/TNK candidate due to Eliquis use. Recommendations below.    Recommendations:        Euglycemia and Avoid Hyperthermia (PRN Acetaminophen)        Systolic BP Cap <180        Metabolic, infectious, and toxic workup including (or per primary team): CXR, UA, CBC, CMP, LFTs, NH3, TSH, VBG, blood cx. Treat abnormalities as appropriate.        Delirium precautions (lights on during day and off at night, family visits as appropriate, familiar objects in room, avoid polypharmacy, nighttime strategies to improve sleep by non-sedating means, encourage mobility as tolerated)        If persistent altered mentation without identifiable cause after the above, obtain MRI brain without contrast.        If persistent altered mentation without identifiable cause after the above, obtain routine spot EEG (20-60 minutes) if available at this hospital. If not available at this hospital, or will  significantly delay discharge, can plan for as an outpatient. If performed and epileptic potential is seen (i.e. sharps, spikes, LRDA) then provide a levetiracetam 1000 mg IV one-time load, followed by 500 mg PO or IV BID thereafter.                      ------------------------------------------------------------------------------    Advanced Imaging:  Advanced Imaging Deferred because:    not consistent with LVO      Metrics:  Last Known Well: 04/24/2024 08:00:00  [facility] Notification Time: 04/24/2024 09:25:39  Arrival Time: 04/24/2024 09:24:00  Stamp Time: 04/24/2024 09:27:15  Initial Response Time: 04/24/2024 09:29:25  Symptoms: confusion.  Initial patient interaction: 04/24/2024 09:29:25  NIHSS Assessment Completed: 04/24/2024 09:35:00  Patient is not a candidate for Thrombolytic.  Thrombolytic Medical Decision: 04/24/2024 09:35:00  Patient was not deemed candidate for Thrombolytic because of following reasons:  Use of NOAs within 48 hours.  Other Diagnosis suspected.    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    Primary Provider Notified of Diagnostic Impression and Management Plan on: 04/24/2024 09:39:29        ------------------------------------------------------------------------------    History of Present Illness:  Patient is a 89 year old Female.    Patient was brought by EMS for symptoms of confusion.  This is a 89 year old female with dementia, HTN, HLD, who takes Eliquis BID, who presents to the emergency department at [facility] [place] by EMS for complaints of confusion.    I called the daughter by phone but could not reach anyone.    Family told EMS that she awoke normal and became confused around 8-8:30 AM.    On exam she is oriented to self but not age or month. She is easily distractible and groans in pain. She is able to follow commands and lift all extremities. She is able to identify  items on stroke cards without aphasia.      Past Medical History:       Hypertension       Hyperlipidemia  Othere PMH:  dementia    Medications:    Anticoagulant use:  Yes Eliquis  No Antiplatelet use  Reviewed EMR for current medications    Allergies:   Reviewed    Social History:  Unable To Obtain Due To Patient Status : Patient Is Confused    Family History:    Family History Cannot Be Obtained Because:Patient Is Confused    ROS : ROS Cannot Be Obtained Because:  Patient Is Confused    Past Surgical History:  Past Surgical History Cannot Be Obtained Because: Patient Is Confused  There Is No Surgical History Contributory To Today’s Visit        Examination:  BP(122/91), Pulse(75),  1A: Level of Consciousness - Alert; keenly responsive + 0  1B: Ask Month and Age - Could Not Answer Either Question Correctly + 2  1C: Blink Eyes & Squeeze Hands - Performs Both Tasks + 0  2: Test Horizontal Extraocular Movements - Normal + 0  3: Test Visual Fields - No Visual Loss + 0  4: Test Facial Palsy (Use Grimace if Obtunded) - Normal symmetry + 0  5A: Test Left Arm Motor Drift - No Drift for 10 Seconds + 0  5B: Test Right Arm Motor Drift - No Drift for 10 Seconds + 0  6A: Test Left Leg Motor Drift - No Drift for 5 Seconds + 0  6B: Test Right Leg Motor Drift - No Drift for 5 Seconds + 0  7: Test Limb Ataxia (FNF/Heel-Shin) - No Ataxia + 0  8: Test Sensation - Normal; No sensory loss + 0  9: Test Language/Aphasia - Normal; No aphasia + 0  10: Test Dysarthria - Normal + 0  11: Test Extinction/Inattention - No abnormality + 0    NIHSS Score: 2    Pre-Morbid Modified Stone Scale:  2 Points = Slight disability; unable to carry out all previous activities, but able to look after own affairs without assistance    Spoke with : ed provider  I reviewed the available imaging via Rapid and initiated discussion with the primary provider    Patient/Family was informed the Neurology Consult would occur via TeleHealth consult by way of  interactive audio and video telecommunications and consented to receiving care in this manner.      Patient is being evaluated for possible acute neurologic impairment and high probability of imminent or life-threatening deterioration. I spent total of 35 minutes providing care to this patient, including time for face to face visit via telemedicine, review of medical records, imaging studies and discussion of findings with providers, the patient and/or family.      Dr Delfino Newman      [facility]  For Inpatient follow-up with [facility] physician please call [facility] [phone]. This is not an outpatient service. Post hospital discharge, please contact hospital directly.    Please do not communicate with [facility] physicians via secure chat. If you have any questions, Please contact [facility].  Please call or reconsult our service if there are any clinical or diagnostic changes.

## 2024-04-24 NOTE — ED PROVIDER NOTES
Time: 12:22 PM EDT  Date of encounter:  4/24/2024  Independent Historian/Clinical History and Information was obtained by:   Family  Chief Complaint: Altered mental status and weakness    History is limited by: N/A    History of Present Illness:  Patient is a 89 y.o. year old female who presents to the emergency department for evaluation of altered mental status and weakness.  The history is per the caregiver due to the patient having dementia.  Caregiver states that the patient had a rough night last night.  She was somewhat weaker than normal.  She was up and down and more confused.  She states that the patient initially woke up this morning at her baseline but she has progressively declined this morning.  She notes worsening confusion from her baseline progressive weakness..  She denies any focal neurodeficits that were obvious.  She does think the patient had any focal weakness on the left side of the right side or facial droop.  She does note that she thinks the patient's speech is more slurred than normal.  Overall the patient is generally weaker.  The patient's had no fever.  The caregiver notes that the patient's had a decreased appetite for 2 to 3 days and has not been eating and drinking well.  States that she has had similar symptoms before with a UTI.    HPI    Patient Care Team  Primary Care Provider: Tony Nelson MD    Past Medical History:     Allergies   Allergen Reactions    Amoxicillin-Pot Clavulanate Hives    Azithromycin Hives    Erythromycin Hives     Past Medical History:   Diagnosis Date    Arthritis     Dementia     Depression     Diabetes mellitus     Disease of thyroid gland     Hypertension      Past Surgical History:   Procedure Laterality Date    BACK SURGERY      HYSTERECTOMY      PACEMAKER REPLACEMENT N/A 7/25/2023    Procedure: PPM generator change - dual;  Surgeon: Nic Seo MD;  Location: [facility] INVASIVE LOCATION;  Service: Cardiovascular;  Laterality: N/A;     History  reviewed. No pertinent family history.    Home Medications:  Prior to Admission medications    Medication Sig Start Date End Date Taking? Authorizing Provider   amLODIPine (NORVASC) 5 MG tablet Take 1 tablet by mouth Daily.   Yes Nithya Murray MD   busPIRone (BUSPAR) 5 MG tablet Take 1 tablet by mouth 2 (Two) Times a Day.   Yes Nithya Murray MD   Eliquis 2.5 MG tablet tablet Take 1 tablet by mouth Every 12 (Twelve) Hours. INSTRUCTED TO STOP 2 DAYS PRIOR TO SURGERY PER DR RICCI'S OFFICE WITH LAST DOSE TO 7-21-23   Yes Nithya Murray MD   hydrOXYzine pamoate (VISTARIL) 50 MG capsule Take 1 capsule by mouth 4 (Four) Times a Day As Needed for Itching, Allergies or Anxiety. 5/15/23  Yes Nithya Murray MD   levothyroxine (SYNTHROID, LEVOTHROID) 88 MCG tablet Take 1 tablet by mouth Daily.   Yes Nithya Murray MD   rosuvastatin (CRESTOR) 10 MG tablet Take 1 tablet by mouth Daily. 4/6/23  Yes Nithya Murray MD   SITagliptin (JANUVIA) 100 MG tablet Take 1 tablet by mouth Daily. INSTRUCTED PER ANESTHESIA ORDERS   Yes Nithya Murray MD   traZODone (DESYREL) 100 MG tablet Take 1 tablet by mouth Every Night. 5/23/23  Yes Nithya Murray MD   albuterol sulfate  (90 Base) MCG/ACT inhaler Inhale 2 puffs Every 4 (Four) Hours As Needed for Wheezing or Shortness of Air. 6/26/23   Nithya Murray MD   fluticasone (FLONASE) 50 MCG/ACT nasal spray 1 spray into the nostril(s) as directed by provider Daily As Needed for Rhinitis or Allergies. 4/4/24   Nithya Murray MD   clindamycin (Cleocin) 300 MG capsule Take 2 capsules by mouth 3 (Three) Times a Day. 7/25/23 4/24/24  Nic Ricci MD   escitalopram (LEXAPRO) 5 MG tablet  5/15/23 4/24/24  Nithya Murray MD   losartan (COZAAR) 50 MG tablet losartan 50 mg oral tablet take 1 tablet (50 mg) by oral route 2 times per day   Active  4/24/24  Nithya Murray MD   vitamin D (ERGOCALCIFEROL) 1.25 MG (13311 UT)  "capsule capsule Take 1 capsule by mouth Every 7 (Seven) Days. 6/26/23 4/24/24  Provider, Nithya, MD        Social History:   Social History     Tobacco Use    Smoking status: Never    Smokeless tobacco: Never   Vaping Use    Vaping status: Never Used   Substance Use Topics    Alcohol use: Never    Drug use: Never         Review of Systems:  Review of Systems   Unable to perform ROS: Dementia        Physical Exam:  /52 (BP Location: Left arm, Patient Position: Lying)   Pulse 59   Temp 98.8 °F (37.1 °C) (Oral)   Resp 20   Ht 165.1 cm (65\")   Wt 58.1 kg (128 lb 1.4 oz)   SpO2 97%   BMI 21.31 kg/m²     Physical Exam  Vitals and nursing note reviewed.   Constitutional:       General: She is not in acute distress.     Appearance: Normal appearance. She is not ill-appearing, toxic-appearing or diaphoretic.   HENT:      Head: Normocephalic and atraumatic.      Mouth/Throat:      Mouth: Mucous membranes are moist.   Eyes:      Pupils: Pupils are equal, round, and reactive to light.   Cardiovascular:      Rate and Rhythm: Normal rate and regular rhythm.      Pulses: Normal pulses.           Carotid pulses are 2+ on the right side and 2+ on the left side.       Radial pulses are 2+ on the right side and 2+ on the left side.        Femoral pulses are 2+ on the right side and 2+ on the left side.       Popliteal pulses are 2+ on the right side and 2+ on the left side.        Dorsalis pedis pulses are 2+ on the right side and 2+ on the left side.        Posterior tibial pulses are 2+ on the right side and 2+ on the left side.      Heart sounds: Normal heart sounds. No murmur heard.  Pulmonary:      Effort: Pulmonary effort is normal. No accessory muscle usage, respiratory distress or retractions.      Breath sounds: Normal breath sounds. No wheezing, rhonchi or rales.   Abdominal:      General: Abdomen is flat. There is no distension.      Palpations: Abdomen is soft. There is no mass or pulsatile mass.      " Tenderness: There is no abdominal tenderness. There is no right CVA tenderness, left CVA tenderness, guarding or rebound.      Comments: No rigidity   Musculoskeletal:         General: No swelling, tenderness or deformity.      Cervical back: Neck supple. No tenderness.      Right lower leg: No edema.      Left lower leg: No edema.   Skin:     General: Skin is warm and dry.      Capillary Refill: Capillary refill takes less than 2 seconds.      Coloration: Skin is not jaundiced or pale.      Findings: No erythema.   Neurological:      General: No focal deficit present.      Mental Status: She is alert and oriented to person, place, and time. She is disoriented.      Cranial Nerves: Cranial nerves 2-12 are intact. No cranial nerve deficit or facial asymmetry.      Sensory: Sensation is intact. No sensory deficit.      Motor: Motor function is intact. Weakness present. No pronator drift.      Comments: A complete neurological exam cannot be performed secondary to patient's comprehension and dementia   Psychiatric:         Mood and Affect: Mood normal.         Behavior: Behavior normal.                  Procedures:  Procedures      Medical Decision Making:      Comorbidities that affect care:    Dementia, diabetes, depression, hypertension, arthritis, DVT, pulmonary embolism    External Notes reviewed:    None      The following orders were placed and all results were independently analyzed by me:  Orders Placed This Encounter   Procedures    Blood Culture - Blood,    Blood Culture - Blood,    COVID-19, FLU A/B, RSV PCR 1 HR TAT - Swab, Nasopharynx    S. Pneumo Ag Urine or CSF - Urine, Urine, Clean Catch    Legionella Antigen, Urine - Urine, Urine, Clean Catch    Respiratory Culture - Sputum, Cough    Mycoplasma Pneumoniae Antibody, IgM - Blood, Arm, Left    Urine Culture - Urine,    CT Head Without Contrast Stroke Protocol    XR Chest 1 View    CT Chest Without Contrast Diagnostic    CT Abdomen Pelvis Without Contrast     [place] Draw    Comprehensive Metabolic Panel    Protime-INR    aPTT    Single High Sensitivity Troponin T    Urinalysis With Microscopic If Indicated (No Culture) - Urine, Clean Catch    CBC Auto Differential    Lactic Acid, Plasma    T4, Free    TSH    Ammonia    Magnesium    Manual Differential    Urinalysis, Microscopic Only - Urine, Clean Catch    CBC Auto Differential    Comprehensive Metabolic Panel    Procalcitonin    Comprehensive Metabolic Panel    Diet: Cardiac; Healthy Heart (2-3 Na+); Texture: Mechanical Ground (NDD 2); Fluid Consistency: Thin (IDDSI 0)    Measure Actual Weight    Head of Bed 30 Degrees or Less    Undress and Gown    Continuous Pulse Oximetry    Vital Signs    Straight cath    Vital Signs    Up With Assistance    Intake & Output    Weigh Patient    Oral Care    Maintain IV Access    Telemetry - Place Orders & Notify Provider of Results When Patient Experiences Acute Chest Pain, Dysrhythmia or Respiratory Distress    Code Status and Medical Interventions:    General MD Inpatient Consult    Inpatient Case Management  Consult    PT Consult: Eval & Treat Functional Mobility Below Baseline    Oxygen Therapy- Nasal Cannula; Titrate 1-6 LPM Per SpO2; 90 - 95%    Oxygen Therapy- Nasal Cannula; Titrate 1-6 LPM Per SpO2; 90 - 95%    SLP Consult: Eval & Treat RN Dysphagia Screen Failed    POC Glucose Once    POC Glucose Once    ECG 12 Lead ED Triage Standing Order; Acute Stroke (Onset <12 hrs)    ECG 12 Lead Rhythm Change    Type & Screen    ABO RH Specimen Verification    Wound Ostomy Eval & Treat    Insert Large Bore Peripheral IV - Right AC Preferred    Insert Peripheral IV    Inpatient Admission    CBC & Differential    Green Top (Gel)    Lavender Top    Gold Top - SST    Light Blue Top    CBC & Differential       Medications Given in the Emergency Department:  Medications   sodium chloride 0.9 % flush 10 mL (has no administration in time range)   apixaban (ELIQUIS) tablet  2.5 mg (2.5 mg Oral Given 4/25/24 0857)   levothyroxine (SYNTHROID, LEVOTHROID) tablet 88 mcg (88 mcg Oral Given 4/25/24 0516)   traZODone (DESYREL) tablet 100 mg (100 mg Oral Given 4/24/24 2047)   sodium chloride 0.9 % flush 10 mL (10 mL Intravenous Given 4/25/24 0857)   sodium chloride 0.9 % flush 10 mL (has no administration in time range)   sodium chloride 0.9 % infusion 40 mL (has no administration in time range)   acetaminophen (TYLENOL) tablet 650 mg (has no administration in time range)   HYDROcodone-acetaminophen (NORCO) 5-325 MG per tablet 1 tablet (has no administration in time range)   morphine injection 2 mg (has no administration in time range)     And   naloxone (NARCAN) injection 0.4 mg (has no administration in time range)   HYDROmorphone (DILAUDID) injection 0.5 mg (has no administration in time range)     And   naloxone (NARCAN) injection 0.4 mg (has no administration in time range)   famotidine (PEPCID) tablet 20 mg (20 mg Oral Given 4/25/24 0857)   ALPRAZolam (XANAX) tablet 0.25 mg (has no administration in time range)   ondansetron (ZOFRAN) injection 4 mg (has no administration in time range)   temazepam (RESTORIL) capsule 15 mg (has no administration in time range)   nitroglycerin (NITROSTAT) SL tablet 0.4 mg (has no administration in time range)   sennosides-docusate (PERICOLACE) 8.6-50 MG per tablet 2 tablet (has no administration in time range)     And   polyethylene glycol (MIRALAX) packet 17 g (has no administration in time range)     And   bisacodyl (DULCOLAX) EC tablet 5 mg (has no administration in time range)     And   bisacodyl (DULCOLAX) suppository 10 mg (has no administration in time range)   dextrose 5 % and sodium chloride 0.9 % with KCl 20 mEq/L infusion (75 mL/hr Intravenous New Bag 4/24/24 2147)   cefTRIAXone (ROCEPHIN) 1000 mg/100 mL 0.9% NS (MBP) (has no administration in time range)   doxycycline (VIBRAMYCIN) 100 mg/100 mL 0.9% NS MBP (100 mg Intravenous New Bag 4/25/24  0855)   potassium chloride 10 mEq in 100 mL IVPB (10 mEq Intravenous New Bag 4/25/24 0857)   potassium chloride (MICRO-K/KLOR-CON) CR capsule (20 mEq Oral Given 4/25/24 0857)   sodium chloride 0.9 % bolus 500 mL (0 mL Intravenous Stopped 4/24/24 1307)   cefTRIAXone (ROCEPHIN) 1000 mg/100 mL 0.9% NS (MBP) (0 mg Intravenous Stopped 4/24/24 1517)   doxycycline (VIBRAMYCIN) 100 mg/100 mL 0.9% NS MBP (100 mg Intravenous New Bag 4/24/24 1517)   ipratropium-albuterol (DUO-NEB) nebulizer solution 3 mL (3 mL Nebulization Given 4/24/24 1237)        ED Course:    ED Course as of 04/25/24 0934 Wed Apr 24, 2024   1049 EKG:    Rhythm: Sinus rhythm with a large amount of baseline artifact  Rate: 61  Intervals: Normal TN normal QT interval  T-wave: Baseline artifact obscures detail, nonspecific T wave flattening  ST Segment: Again, the baseline artifact obscures detail, there is no obvious pathological ST elevation or reciprocal ST depression to suggest STEMI    EKG Comparison: The EKG is changed in EKG performed July 25, 2023 due to the artifact    Interpreted by me   [SD]      ED Course User Index  [SD] Jameel Aldrich DO       Labs:    Lab Results (last 24 hours)       Procedure Component Value Units Date/Time    CBC & Differential [[id number]]  (Abnormal) Collected: 04/24/24 0939    Specimen: Blood Updated: 04/24/24 1010    Narrative:      The following orders were created for panel order CBC & Differential.  Procedure                               Abnormality         Status                     ---------                               -----------         ------                     CBC Auto Differential[[id number]]        Abnormal            Final result               Scan Slide[[id number]]                                                                    Please view results for these tests on the individual orders.    Comprehensive Metabolic Panel [[id number]]  (Abnormal) Collected: 04/24/24 0939    Specimen: Blood Updated:  04/24/24 1036     Glucose 177 mg/dL      BUN 51 mg/dL      Creatinine 1.13 mg/dL      Sodium 135 mmol/L      Potassium 3.4 mmol/L      Comment: Slight hemolysis detected by analyzer. Result may be falsely elevated.        Chloride 99 mmol/L      CO2 21.1 mmol/L      Calcium 9.0 mg/dL      Total Protein 6.7 g/dL      Albumin 3.5 g/dL      ALT (SGPT) 8 U/L      AST (SGOT) 15 U/L      Comment: Slight hemolysis detected by analyzer. Result may be falsely elevated.        Alkaline Phosphatase 53 U/L      Total Bilirubin 0.4 mg/dL      Globulin 3.2 gm/dL      A/G Ratio 1.1 g/dL      BUN/Creatinine Ratio 45.1     Anion Gap 14.9 mmol/L      eGFR 45.7 mL/min/1.73     Narrative:      GFR Normal >60  Chronic Kidney Disease <60  Kidney Failure <15    The GFR formula is only valid for adults with stable renal function between ages 18 and 70.    Protime-INR [[id number]]  (Abnormal) Collected: 04/24/24 0939    Specimen: Blood Updated: 04/24/24 1003     Protime 15.7 Seconds      INR 1.23    Narrative:      Suggested Therapeutic Ranges For Oral Anticoagulant Therapy:  Level of Therapy                      INR Target Range  Standard Dose                            2.0-3.0  High Dose                                2.5-3.5  Patients not receiving anticoagulant  Therapy Normal Range                     0.86-1.15    aPTT [[id number]]  (Normal) Collected: 04/24/24 0939    Specimen: Blood Updated: 04/24/24 1003     PTT 25.4 seconds     Single High Sensitivity Troponin T [[id number]]  (Abnormal) Collected: 04/24/24 0939    Specimen: Blood Updated: 04/24/24 1013     HS Troponin T 19 ng/L     Narrative:      High Sensitive Troponin T Reference Range:  <14.0 ng/L- Negative Female for AMI  <22.0 ng/L- Negative Male for AMI  >=14 - Abnormal Female indicating possible myocardial injury.  >=22 - Abnormal Male indicating possible myocardial injury.   Clinicians would have to utilize clinical acumen, EKG, Troponin, and serial changes to determine if it  is an Acute Myocardial Infarction or myocardial injury due to an underlying chronic condition.         CBC Auto Differential [[id number]]  (Abnormal) Collected: 04/24/24 0939    Specimen: Blood Updated: 04/24/24 1010     WBC 23.78 10*3/mm3      RBC 4.92 10*6/mm3      Hemoglobin 13.6 g/dL      Hematocrit 41.0 %      MCV 83.3 fL      MCH 27.6 pg      MCHC 33.2 g/dL      RDW 14.6 %      RDW-SD 44.1 fl      MPV 10.4 fL      Platelets 406 10*3/mm3     T4, Free [[id number]]  (Normal) Collected: 04/24/24 0939    Specimen: Blood Updated: 04/24/24 1032     Free T4 1.61 ng/dL     TSH [[id number]]  (Abnormal) Collected: 04/24/24 0939    Specimen: Blood Updated: 04/24/24 1032     TSH 0.071 uIU/mL     Magnesium [[id number]]  (Normal) Collected: 04/24/24 0939    Specimen: Blood Updated: 04/24/24 1036     Magnesium 2.0 mg/dL     Manual Differential [[id number]]  (Abnormal) Collected: 04/24/24 0939    Specimen: Blood Updated: 04/24/24 1010     Neutrophil % 59.0 %      Lymphocyte % 24.0 %      Monocyte % 7.0 %      Eosinophil % 2.0 %      Bands %  6.0 %      Atypical Lymphocyte % 2.0 %      Neutrophils Absolute 15.46 10*3/mm3      Lymphocytes Absolute 6.18 10*3/mm3      Monocytes Absolute 1.66 10*3/mm3      Eosinophils Absolute 0.48 10*3/mm3      RBC Morphology Normal     WBC Morphology Normal     Platelet Estimate Adequate    Mycoplasma Pneumoniae Antibody, IgM - Blood, [[id number]]  (Abnormal) Collected: 04/24/24 0939    Specimen: Blood Updated: 04/24/24 1346     Mycoplasma pneumo IgM Positive    Procalcitonin [[id number]]  (Normal) Collected: 04/24/24 0939    Specimen: Blood Updated: 04/24/24 1338     Procalcitonin 0.08 ng/mL     Narrative:      As a Marker for Sepsis (Non-Neonates):    1. <0.5 ng/mL represents a low risk of severe sepsis and/or septic shock.  2. >2 ng/mL represents a high risk of severe sepsis and/or septic shock.    As a Marker for Lower Respiratory Tract Infections that require antibiotic therapy:    PCT on Admission  "   Antibiotic Therapy       6-12 Hrs later    >0.5                Strongly Recommended  >0.25 - <0.5        Recommended  0.1 - 0.25          Discouraged              Remeasure/reassess PCT  <0.1                Strongly Discouraged     Remeasure/reassess PCT    As 28 day mortality risk marker: \"Change in Procalcitonin Result\" (>80% or <=80%) if Day 0 (or Day 1) and Day 4 values are available. Refer to http://www.[facility]-pct-calculator.com    Change in PCT <=80%  A decrease of PCT levels below or equal to 80% defines a positive change in PCT test result representing a higher risk for 28-day all-cause mortality of patients diagnosed with severe sepsis for septic shock.    Change in PCT >80%  A decrease of PCT levels of more than 80% defines a negative change in PCT result representing a lower risk for 28-day all-cause mortality of patients diagnosed with severe sepsis or septic shock.    This test is Prognostic not Diagnostic, if elevated correlate with clinical findings before administering antibiotic treatment.        Urinalysis With Microscopic If Indicated (No Culture) - Urine, Clean Catch [[id number]]  (Abnormal) Collected: 04/24/24 1015    Specimen: Urine, Clean Catch Updated: 04/24/24 1032     Color, UA Yellow     Appearance, UA Clear     pH, UA 6.0     Specific Gravity, UA 1.023     Glucose, UA Negative     Ketones, UA Negative     Bilirubin, UA Negative     Blood, UA Negative     Protein, UA Trace     Leuk Esterase, UA Trace     Nitrite, UA Negative     Urobilinogen, UA 0.2 E.U./dL    Urinalysis, Microscopic Only - Urine, Clean Catch [[id number]]  (Abnormal) Collected: 04/24/24 1015    Specimen: Urine, Clean Catch Updated: 04/24/24 1032     RBC, UA 0-2 /HPF      WBC, UA 3-5 /HPF      Bacteria, UA None Seen /HPF      Squamous Epithelial Cells, UA 0-2 /HPF      Hyaline Casts, UA 0-2 /LPF      Methodology Automated Microscopy    S. Pneumo Ag Urine or CSF - Urine, Urine, Clean Catch [[id number]]  (Normal) Collected: " 04/24/24 1015    Specimen: Urine, Clean Catch Updated: 04/24/24 1332     Strep Pneumo Ag Negative    Legionella Antigen, Urine - Urine, Urine, Clean Catch [[id number]]  (Normal) Collected: 04/24/24 1015    Specimen: Urine, Clean Catch Updated: 04/24/24 1331     LEGIONELLA ANTIGEN, URINE Negative    Urine Culture - Urine, Urine, Clean Catch [[id number]] Collected: 04/24/24 1015    Specimen: Urine, Clean Catch Updated: 04/24/24 1312    COVID-19, FLU A/B, RSV PCR 1 HR TAT - Swab, Nasopharynx [[id number]]  (Normal) Collected: 04/24/24 1017    Specimen: Swab from Nasopharynx Updated: 04/24/24 1138     COVID19 Not Detected     Influenza A PCR Not Detected     Influenza B PCR Not Detected     RSV, PCR Not Detected    Narrative:      Fact sheet for providers: https://www.fda.gov/media/793120/download    Fact sheet for patients: https://www.fda.gov/media/697645/download    Test performed by PCR.    Blood Culture - Blood, Arm, Left [[id number]] Collected: 04/24/24 1129    Specimen: Blood from Arm, Left Updated: 04/24/24 1139    Lactic Acid, Plasma [[id number]]  (Normal) Collected: 04/24/24 1129    Specimen: Blood Updated: 04/24/24 1212     Lactate 1.5 mmol/L     Blood Culture - Blood, Arm, Right [[id number]] Collected: 04/24/24 1154    Specimen: Blood from Arm, Right Updated: 04/24/24 1200    Ammonia [[id number]]  (Normal) Collected: 04/24/24 1154    Specimen: Blood from Arm, Right Updated: 04/24/24 1223     Ammonia 13 umol/L     CBC Auto Differential [[id number]]  (Abnormal) Collected: 04/25/24 0446    Specimen: Blood from Arm, Left Updated: 04/25/24 0534     WBC 12.92 10*3/mm3      RBC 3.89 10*6/mm3      Hemoglobin 10.7 g/dL      Hematocrit 33.0 %      MCV 84.8 fL      MCH 27.5 pg      MCHC 32.4 g/dL      RDW 14.8 %      RDW-SD 45.0 fl      MPV 10.4 fL      Platelets 323 10*3/mm3      Neutrophil % 62.1 %      Lymphocyte % 24.8 %      Monocyte % 9.9 %      Eosinophil % 2.1 %      Basophil % 0.5 %      Immature Grans % 0.6 %       Neutrophils, Absolute 8.02 10*3/mm3      Lymphocytes, Absolute 3.21 10*3/mm3      Monocytes, Absolute 1.28 10*3/mm3      Eosinophils, Absolute 0.27 10*3/mm3      Basophils, Absolute 0.06 10*3/mm3      Immature Grans, Absolute 0.08 10*3/mm3      nRBC 0.0 /100 WBC     Comprehensive Metabolic Panel [[id number]]  (Abnormal) Collected: 04/25/24 0446    Specimen: Blood from Arm, Left Updated: 04/25/24 0551     Glucose 147 mg/dL      BUN 27 mg/dL      Creatinine 0.47 mg/dL      Sodium 142 mmol/L      Potassium 3.0 mmol/L      Chloride 109 mmol/L      CO2 24.0 mmol/L      Calcium 8.2 mg/dL      Total Protein 5.6 g/dL      Albumin 3.0 g/dL      ALT (SGPT) 7 U/L      AST (SGOT) 11 U/L      Alkaline Phosphatase 44 U/L      Total Bilirubin 0.4 mg/dL      Globulin 2.6 gm/dL      A/G Ratio 1.2 g/dL      BUN/Creatinine Ratio 57.4     Anion Gap 9.0 mmol/L      eGFR 89.4 mL/min/1.73     Narrative:      GFR Normal >60  Chronic Kidney Disease <60  Kidney Failure <15    The GFR formula is only valid for adults with stable renal function between ages 18 and 70.             Imaging:    CT Chest Without Contrast Diagnostic, CT Abdomen Pelvis Without Contrast    Result Date: 4/24/2024  CT CHEST WO CONTRAST DIAGNOSTIC-, CT ABDOMEN PELVIS WO CONTRAST-  Date of Exam: 4/24/2024 11:35 AM  Indication: Shortness of breath.  Comparison Exams: Chest radiograph from earlier today  Technique: CT scan of the chest abdomen and pelvis without IV contrast. Automated exposure control and iterative reconstruction methods were used.   Findings: Chest:  The central tracheobronchial tree is clear. There is some scarring at the lung apices. There is bilateral bronchiectasis most prominent in the right middle lobe. There is partial atelectasis of the right middle lobe. There are scattered bilateral small nodular densities and tree-in-bud nodules, likely an infectious or inflammatory process. There is no dense consolidation. There is no pleural effusion.  A left  subclavian pacemaker is in place. The heart size appears normal, with evidence of calcified coronary artery disease. The great vessels are normal caliber. No abnormally enlarged lymph nodes are identified.  No aggressive osseous lesions are identified.  Abdomen/pelvis:  The unenhanced liver, gallbladder, adrenal glands, kidneys, spleen, and pancreas are unremarkable.  There is a moderate hiatal hernia. The small bowel appears normal in caliber and configuration. There is sigmoid diverticulosis. The appendix appears normal. There is no ascites or loculated collection. No abnormally enlarged lymph nodes are identified.  There is prominent pelvic floor laxity, and otherwise the rectum and urinary bladder are unremarkable. The uterus is surgically absent.  No aggressive osseous lesions are identified.      Impression: 1.  Bilateral bronchiectasis with scattered small nodular densities and tree-in-bud nodules, likely a chronic infectious or inflammatory process. However, a superimposed acute component is possible. 2.  No acute process identified within abdomen/pelvis. 3.  Moderate hiatal hernia. 4.  Sigmoid diverticulosis. 5.  Prominent pelvic floor laxity.     Electronically Signed By-Bryan Gillette MD On:4/24/2024 12:14 PM      XR Chest 1 View    Result Date: 4/24/2024  XR CHEST 1 VW-  Date of Exam: 4/24/2024 10:34 AM  Indication: Acute Stroke Protocol (Onset < 12 hrs).  Comparison Exams: June 8, 2023  Technique: Single AP chest radiograph  FINDINGS: The lungs are clear. The heart and mediastinal contours appear normal. The pulmonary vasculature appears normal. The osseous structures appear intact. A left subclavian pacemaker is in place.      No acute cardiopulmonary process identified.   Electronically Signed By-Bryan Gillette MD On:4/24/2024 10:36 AM         Differential Diagnosis and Discussion:    Altered Mental Status: Based on the patient's signs and symptoms, differential diagnosis includes but is not  limited to meningitis, stroke, sepsis, subarachnoid hemorrhage, intracranial bleeding, encephalitis, and metabolic encephalopathy.    All labs were reviewed and interpreted by me.  All X-rays impressions were independently interpreted by me.  EKG was interpreted by me.  CT scan radiology impression was interpreted by me.    MDM  Number of Diagnoses or Management Options  Bronchiectasis without complication  Dehydration  Encephalopathy  Leukocytosis, unspecified type  Pneumonia of both lungs due to infectious organism, unspecified part of lung  Diagnosis management comments: Sepsis was not present in the emergency department or on arrival. This is supported as the patient does not either meet two out of the four SIRS criteria or has an obvious bacterial infection.      SIRS criteria considered:   1.                     Temperature > 100.4 or <98.6    2.                     Heart Rate > 90    3.                     Respiratory Rate > 22    4.                     WBC > 12K or <4K.    The patient's Covid swab was negative   The patient's Influenza swab was negative     The patient's urinalysis was negative for obvious infection or blood    Patient had a marked leukocytosis with a white blood cell count of 23,000.  The patient had 6% bands.  The patient's CBC was reviewed and shows no abnormalities of critical concern.  Of note, there is no anemia requiring a blood transfusion and the platelet count is acceptable    The patient's CMP was reviewed and shows no abnormalities of critical concern.  Of note, the patient's sodium and potassium are acceptable.  The patient's liver enzymes are unremarkable.  The patient's renal function including creatinine is preserved.  The patient has a normal anion gap.    Chest x-ray was performed while in the emergency room.  The chest x-ray demonstrated no acute cardiopulmonary process    CT scan of the chest and abdomen were performed due to the fact that the patient had a marked  leukocytosis without source.    CT scan of the chest demonstrates bilateral bronchiectasis with scattered small nodular densities and tree-in-bud nodules likely to be chronic infectious or inflammatory process.  I do suspect that there is a acute component and this is most likely infectious in light of the patient's elevated white blood cell count.    The patient was treated with Rocephin and doxycycline.    CT scan of the brain demonstrated no acute intracranial findings       Amount and/or Complexity of Data Reviewed  Clinical lab tests: reviewed  Tests in the radiology section of CPT®: reviewed  Tests in the medicine section of CPT®: reviewed  Discuss the patient with other providers: yes (9:55 AM  I discussed the case with , teleneurologist.  He evaluated the patient immediately after arrival.  He did not feel the patient was a TNKase candidate due to the fact that the patient is on Eliquis and has a generalized encephalopathy and not focal deficits.  He feels the patient should have a generalized encephalopathy workup and be admitted to the hospital.  Teleneurology will follow as needed      12:41 EDT  I discussed case with Dr. Thurman.  We discussed the patient's presentation, past medical history, laboratory findings, imaging findings.  He agrees with the current assessment and plan.  He will admit the patient to the hospital.)         Social Determinants of Health:    Patient has presented with family members who are responsible, reliable and will ensure follow up care.      Disposition and Care Coordination:    Admit:   Through independent evaluation of the patient's history, physical, and imperical data, the patient meets criteria for inpatient admission to the hospital.        Final diagnoses:   Encephalopathy   Leukocytosis, unspecified type   Bronchiectasis without complication   Pneumonia of both lungs due to infectious organism, unspecified part of lung   Dehydration        ED Disposition        ED Disposition   Decision to Admit    Condition   --    Comment   Level of Care: Telemetry [5]   Diagnosis: PNA (pneumonia) [[id number]]   Admitting Physician: CALVIN HARRIS [[id number]]   Attending Physician: CALVIN HARRIS [[id number]]   Certification: I Certify That Inpatient Hospital Services Are Medically Necessary For Greater Than 2 Midnights                 This medical record created using voice recognition software.             Jameel Aldrich DO  04/25/24 0934

## 2024-04-25 PROBLEM — E87.6 HYPOKALEMIA: Status: ACTIVE | Noted: 2024-04-25

## 2024-04-25 LAB
ALBUMIN SERPL-MCNC: 3 G/DL (ref 3.5–5.2)
ALBUMIN/GLOB SERPL: 1.2 G/DL
ALP SERPL-CCNC: 44 U/L (ref 39–117)
ALT SERPL W P-5'-P-CCNC: 7 U/L (ref 1–33)
ANION GAP SERPL CALCULATED.3IONS-SCNC: 9 MMOL/L (ref 5–15)
AST SERPL-CCNC: 11 U/L (ref 1–32)
BACTERIA SPEC AEROBE CULT: NO GROWTH
BASOPHILS # BLD AUTO: 0.06 10*3/MM3 (ref 0–0.2)
BASOPHILS NFR BLD AUTO: 0.5 % (ref 0–1.5)
BILIRUB SERPL-MCNC: 0.4 MG/DL (ref 0–1.2)
BUN SERPL-MCNC: 27 MG/DL (ref 8–23)
BUN/CREAT SERPL: 57.4 (ref 7–25)
CALCIUM SPEC-SCNC: 8.2 MG/DL (ref 8.2–9.6)
CHLORIDE SERPL-SCNC: 109 MMOL/L (ref 98–107)
CO2 SERPL-SCNC: 24 MMOL/L (ref 22–29)
CREAT SERPL-MCNC: 0.47 MG/DL (ref 0.57–1)
DEPRECATED RDW RBC AUTO: 45 FL (ref 37–54)
EGFRCR SERPLBLD CKD-EPI 2021: 89.4 ML/MIN/1.73
EOSINOPHIL # BLD AUTO: 0.27 10*3/MM3 (ref 0–0.4)
EOSINOPHIL NFR BLD AUTO: 2.1 % (ref 0.3–6.2)
ERYTHROCYTE [DISTWIDTH] IN BLOOD BY AUTOMATED COUNT: 14.8 % (ref 12.3–15.4)
GLOBULIN UR ELPH-MCNC: 2.6 GM/DL
GLUCOSE BLDC GLUCOMTR-MCNC: 179 MG/DL (ref 70–99)
GLUCOSE SERPL-MCNC: 147 MG/DL (ref 65–99)
HCT VFR BLD AUTO: 33 % (ref 34–46.6)
HGB BLD-MCNC: 10.7 G/DL (ref 12–15.9)
IMM GRANULOCYTES # BLD AUTO: 0.08 10*3/MM3 (ref 0–0.05)
IMM GRANULOCYTES NFR BLD AUTO: 0.6 % (ref 0–0.5)
LYMPHOCYTES # BLD AUTO: 3.21 10*3/MM3 (ref 0.7–3.1)
LYMPHOCYTES NFR BLD AUTO: 24.8 % (ref 19.6–45.3)
MCH RBC QN AUTO: 27.5 PG (ref 26.6–33)
MCHC RBC AUTO-ENTMCNC: 32.4 G/DL (ref 31.5–35.7)
MCV RBC AUTO: 84.8 FL (ref 79–97)
MONOCYTES # BLD AUTO: 1.28 10*3/MM3 (ref 0.1–0.9)
MONOCYTES NFR BLD AUTO: 9.9 % (ref 5–12)
NEUTROPHILS NFR BLD AUTO: 62.1 % (ref 42.7–76)
NEUTROPHILS NFR BLD AUTO: 8.02 10*3/MM3 (ref 1.7–7)
NRBC BLD AUTO-RTO: 0 /100 WBC (ref 0–0.2)
PLATELET # BLD AUTO: 323 10*3/MM3 (ref 140–450)
PMV BLD AUTO: 10.4 FL (ref 6–12)
POTASSIUM SERPL-SCNC: 3 MMOL/L (ref 3.5–5.2)
PROT SERPL-MCNC: 5.6 G/DL (ref 6–8.5)
RBC # BLD AUTO: 3.89 10*6/MM3 (ref 3.77–5.28)
SODIUM SERPL-SCNC: 142 MMOL/L (ref 136–145)
WBC NRBC COR # BLD AUTO: 12.92 10*3/MM3 (ref 3.4–10.8)

## 2024-04-25 PROCEDURE — 25010000002 POTASSIUM CHLORIDE 10 MEQ/100ML SOLUTION: Performed by: INTERNAL MEDICINE

## 2024-04-25 PROCEDURE — 99232 SBSQ HOSP IP/OBS MODERATE 35: CPT | Performed by: PSYCHIATRY & NEUROLOGY

## 2024-04-25 PROCEDURE — 25010000002 CEFTRIAXONE PER 250 MG: Performed by: INTERNAL MEDICINE

## 2024-04-25 PROCEDURE — 80053 COMPREHEN METABOLIC PANEL: CPT | Performed by: INTERNAL MEDICINE

## 2024-04-25 PROCEDURE — 97161 PT EVAL LOW COMPLEX 20 MIN: CPT

## 2024-04-25 PROCEDURE — 85025 COMPLETE CBC W/AUTO DIFF WBC: CPT | Performed by: INTERNAL MEDICINE

## 2024-04-25 PROCEDURE — 92610 EVALUATE SWALLOWING FUNCTION: CPT

## 2024-04-25 RX ORDER — POTASSIUM CHLORIDE 750 MG/1
20 CAPSULE, EXTENDED RELEASE ORAL 2 TIMES DAILY WITH MEALS
Status: COMPLETED | OUTPATIENT
Start: 2024-04-25 | End: 2024-04-27

## 2024-04-25 RX ORDER — POTASSIUM CHLORIDE 7.45 MG/ML
10 INJECTION INTRAVENOUS
Status: COMPLETED | OUTPATIENT
Start: 2024-04-25 | End: 2024-04-25

## 2024-04-25 RX ORDER — ZINC OXIDE 20 %
1 OINTMENT (GRAM) TOPICAL 3 TIMES DAILY
Status: DISCONTINUED | OUTPATIENT
Start: 2024-04-25 | End: 2024-04-26

## 2024-04-25 RX ADMIN — Medication 10 ML: at 08:57

## 2024-04-25 RX ADMIN — POTASSIUM CHLORIDE 20 MEQ: 750 CAPSULE, EXTENDED RELEASE ORAL at 17:50

## 2024-04-25 RX ADMIN — POTASSIUM CHLORIDE 20 MEQ: 750 CAPSULE, EXTENDED RELEASE ORAL at 08:57

## 2024-04-25 RX ADMIN — CEFTRIAXONE SODIUM 1000 MG: 1 INJECTION, POWDER, FOR SOLUTION INTRAMUSCULAR; INTRAVENOUS at 14:23

## 2024-04-25 RX ADMIN — DOXYCYCLINE 100 MG: 100 INJECTION, POWDER, LYOPHILIZED, FOR SOLUTION INTRAVENOUS at 08:55

## 2024-04-25 RX ADMIN — FAMOTIDINE 20 MG: 20 TABLET ORAL at 08:57

## 2024-04-25 RX ADMIN — POTASSIUM CHLORIDE 10 MEQ: 7.46 INJECTION, SOLUTION INTRAVENOUS at 08:57

## 2024-04-25 RX ADMIN — ZINC OXIDE 1 APPLICATION: 200 OINTMENT TOPICAL at 17:49

## 2024-04-25 RX ADMIN — DOXYCYCLINE 100 MG: 100 INJECTION, POWDER, LYOPHILIZED, FOR SOLUTION INTRAVENOUS at 20:55

## 2024-04-25 RX ADMIN — POTASSIUM CHLORIDE 10 MEQ: 7.46 INJECTION, SOLUTION INTRAVENOUS at 10:23

## 2024-04-25 RX ADMIN — LEVOTHYROXINE SODIUM 88 MCG: 88 TABLET ORAL at 05:16

## 2024-04-25 RX ADMIN — APIXABAN 2.5 MG: 2.5 TABLET, FILM COATED ORAL at 20:55

## 2024-04-25 RX ADMIN — APIXABAN 2.5 MG: 2.5 TABLET, FILM COATED ORAL at 08:57

## 2024-04-25 RX ADMIN — TRAZODONE HYDROCHLORIDE 100 MG: 100 TABLET ORAL at 20:55

## 2024-04-25 NOTE — THERAPY EVALUATION
Acute Care - Speech Language Pathology   Swallow Initial Evaluation  [facility]     Patient Name: Shani Burnett  : 1932  MRN: [id number]  Today's Date: 2024               Admit Date: 2024    Visit Dx:     ICD-10-CM ICD-9-CM   1. Encephalopathy  G93.40 348.30   2. Leukocytosis, unspecified type  D72.829 288.60   3. Bronchiectasis without complication  J47.9 494.0   4. Pneumonia of both lungs due to infectious organism, unspecified part of lung  J18.9 483.8   5. Dehydration  E86.0 276.51     Patient Active Problem List   Diagnosis    Arthritis    Bunion    Corns and callosity    Diabetes type 2, controlled    DVT (deep venous thrombosis)    Glaucoma    Hypertension    High cholesterol    Stroke    Chronic HFrEF (heart failure with reduced ejection fraction)    Bradycardia    Pacemaker    Pacemaker at end of battery life    PNA (pneumonia)    Acute kidney injury    Altered mental status    Hypokalemia     Past Medical History:   Diagnosis Date    Arthritis     Dementia     Depression     Diabetes mellitus     Disease of thyroid gland     Hypertension      Past Surgical History:   Procedure Laterality Date    BACK SURGERY      HYSTERECTOMY      PACEMAKER REPLACEMENT N/A 2023    Procedure: PPM generator change - dual;  Surgeon: Nic Seo MD;  Location: [facility] INVASIVE LOCATION;  Service: Cardiovascular;  Laterality: N/A;       Inpatient Speech Pathology Dysphagia Evaluation        PAIN SCALE: None noted    PRECAUTIONS/CONTRAINDICATIONS: None noted    SUSPECTED ABUSE/NEGLECT/EXPLOITATION: None noted    SOCIAL/PSYCHOLOGICAL NEEDS/BARRIERS: None noted    PAST SOCIAL HISTORY: Dependent for care    PRIOR FUNCTION: Lives with daughter who assists with daily care    PATIENT GOALS/EXPECTATIONS: Did not report    HISTORY: This patient is a 89-year-old female admitted with the above diagnosis CT of the chest shows bilateral bronchiectasis and scattered nodular densities.    CURRENT DIET LEVEL: Regular  diet    OBJECTIVE:    TEST ADMINISTERED: Clinical dysphagia evaluation    COGNITION/SAFETY AWARENESS: Drowsy will awaken easily with stimulation    BEHAVIORAL OBSERVATIONS: Cooperative    ORAL MOTOR EXAM: Generalized oral motor weakness is noted    VOICE QUALITY: Reduced volume    REFLEX EXAM: Deferred    POSTURE: 90 degrees upright    FEEDING/SWALLOWING FUNCTION: Assessed with thin liquids from cup and straw, purée consistencies soft and regular solids    CLINICAL OBSERVATIONS: Oral stage is characterized by prolonged mastication with regular solids some scattered oral residue noted throughout with soft and regular solids.  Slow oral transit.  Pharyngeal phase appears timely with good laryngeal elevation per palpation.  No clinical signs or symptoms of aspiration noted.  Denies globus sensation after completion of swallow.    DYSPHAGIA CRITERIA: N/A    FUNCTIONAL ASSESSMENT INSTRUMENT: Patient currently scored a level 6 of 7 on Functional Communication Measures for swallowing indicating a 1-19% limitation in function.    ASSESSMENT/ PLAN OF CARE:  Pt presents with functional oropharyngeal swallow.  No clinical signs or symptoms of aspiration noted.  Prolonged mastication with regular solids with scattered oral residue    REHAB POTENTIAL:  Pt has good rehab potential.  The following limitations may influence improvement/ length of tx medical status.    RECOMMENDATIONS:   1.   DIET: Diet downgraded to mechanical soft and thin liquids    2.  POSITION: 90 degrees upright for all intake    3.  COMPENSATORY STRATEGIES: Slow rate, small bites/drinks, monitor for pocketing, patient may need supervision during meals    Pt/responsible party agrees with plan of care and has been informed of all alternatives, risks and benefits.        EDUCATION  The patient has been educated in the following areas:   Dysphagia (Swallowing Impairment).       Willa Kahn, SLP  4/25/2024

## 2024-04-25 NOTE — H&P
[facility]   HISTORY AND PHYSICAL    Patient Name: Shani Burnett  : 1932  MRN: [id number]  Primary Care Physician:  Tony Nelson MD  Date of admission: 2024    Subjective   Subjective     Chief Complaint:   Weakness and confusion      HPI:    Shani Burnett is a 89 y.o. female who was brought into the ER for weakness and confusion.  Patient woke up this morning and was confused according to daughter who takes care of her.  Patient daughters are at bedside.  Reports she has been feeling not good for couple of days.  There was some weakness.  There may be questionable weakness on 1 side or the other.  Patient was brought into ER and stroke alert was initiated, teleneurology saw patient.  Patient had further workup and diagnosed with pneumonia.  When I saw patient is sleepy.  Family members at bedside.  There is no report of fever or chills.  There is no report of nausea vomiting diarrhea but reports of poor p.o. intake..      Review of Systems:    No fever chills.  Decreased appetite.  Increased confusion.  Memory loss and forgetful.  Very weak difficult to get up and walk.  No nausea vomiting or diarrhea, no cough    Personal History     Past Medical History:   Diagnosis Date    Arthritis     Dementia     Depression     Diabetes mellitus     Disease of thyroid gland     Hypertension        Past Surgical History:   Procedure Laterality Date    BACK SURGERY      HYSTERECTOMY      PACEMAKER REPLACEMENT N/A 2023    Procedure: PPM generator change - dual;  Surgeon: Nic Seo MD;  [facility];  Service: Cardiovascular;  Laterality: N/A;       Family History: family history is not on file. Otherwise pertinent FHx was reviewed and not pertinent to current issue.    Social History:  reports that she has never smoked. She has never used smokeless tobacco. She reports that she does not drink alcohol and does not use drugs.    Home Medications:  SITagliptin, albuterol sulfate HFA,  amLODIPine, apixaban, busPIRone, fluticasone, hydrOXYzine pamoate, levothyroxine, rosuvastatin, and traZODone      Allergies:  Allergies   Allergen Reactions    Amoxicillin-Pot Clavulanate Hives    Azithromycin Hives    Erythromycin Hives       Objective   Objective     Vitals:   Temp:  [97.7 °F (36.5 °C)-98.1 °F (36.7 °C)] 98.1 °F (36.7 °C)  Heart Rate:  [59-97] 59  Resp:  [17-20] 20  BP: (122-137)/() 137/73  Flow (L/min):  [2] 2    Physical Exam    Elderly female, looks of her stated age, not in acute distress, arousable, moans at times.  HEENT with dry mucous membranes.  Neck supple.  Heart regular grade 2 murmur.  Lungs diminished breath sounds but clear.  Abdomen soft and obese nontender.  No CVA tenderness.  Extremities trace of edema.  Neuro arousable, difficult to do exam as patient more sleepy      I have personally reviewed the results from the time of this admission to 4/24/2024 20:18 EDT and agree with these findings:  [x]  Laboratory  [x]  Microbiology  [x]  Radiology  []  EKG/Telemetry   []  Cardiology/Vascular   []  Pathology  []  Old records  []  Other:    CBC:    WBC   Date Value Ref Range Status   04/24/2024 23.78 (H) 3.40 - 10.80 10*3/mm3 Final     RBC   Date Value Ref Range Status   04/24/2024 4.92 3.77 - 5.28 10*6/mm3 Final     Hemoglobin   Date Value Ref Range Status   04/24/2024 13.6 12.0 - 15.9 g/dL Final     Hematocrit   Date Value Ref Range Status   04/24/2024 41.0 34.0 - 46.6 % Final     MCV   Date Value Ref Range Status   04/24/2024 83.3 79.0 - 97.0 fL Final     MCH   Date Value Ref Range Status   04/24/2024 27.6 26.6 - 33.0 pg Final     MCHC   Date Value Ref Range Status   04/24/2024 33.2 31.5 - 35.7 g/dL Final     RDW   Date Value Ref Range Status   04/24/2024 14.6 12.3 - 15.4 % Final     RDW-SD   Date Value Ref Range Status   04/24/2024 44.1 37.0 - 54.0 fl Final     MPV   Date Value Ref Range Status   04/24/2024 10.4 6.0 - 12.0 fL Final     Platelets   Date Value Ref Range Status    04/24/2024 406 140 - 450 10*3/mm3 Final     Neutrophils Absolute   Date Value Ref Range Status   04/24/2024 15.46 (H) 1.70 - 7.00 10*3/mm3 Final     Eosinophils Absolute   Date Value Ref Range Status   04/24/2024 0.48 (H) 0.00 - 0.40 10*3/mm3 Final        BMP:    Lab Results   Component Value Date    GLUCOSE 177 (H) 04/24/2024    BUN 51 (H) 04/24/2024    CREATININE 1.13 (H) 04/24/2024    BCR 45.1 (H) 04/24/2024    K 3.4 (L) 04/24/2024    CO2 21.1 (L) 04/24/2024    CALCIUM 9.0 04/24/2024    ALBUMIN 3.5 04/24/2024    LABIL2 1.2 (L) 04/06/2020    AST 15 04/24/2024    ALT 8 04/24/2024        CT Chest Without Contrast Diagnostic    Result Date: 4/24/2024  Impression: 1.  Bilateral bronchiectasis with scattered small nodular densities and tree-in-bud nodules, likely a chronic infectious or inflammatory process. However, a superimposed acute component is possible. 2.  No acute process identified within abdomen/pelvis. 3.  Moderate hiatal hernia. 4.  Sigmoid diverticulosis. 5.  Prominent pelvic floor laxity.     Electronically Signed ByQuinn Gillette MD On:4/24/2024 12:14 PM      CT Abdomen Pelvis Without Contrast    Result Date: 4/24/2024  Impression: 1.  Bilateral bronchiectasis with scattered small nodular densities and tree-in-bud nodules, likely a chronic infectious or inflammatory process. However, a superimposed acute component is possible. 2.  No acute process identified within abdomen/pelvis. 3.  Moderate hiatal hernia. 4.  Sigmoid diverticulosis. 5.  Prominent pelvic floor laxity.     Electronically Signed ByQuinn Gillette MD On:4/24/2024 12:14 PM      XR Chest 1 View    Result Date: 4/24/2024  No acute cardiopulmonary process identified.   Electronically Signed ByQuinn Gillette MD On:4/24/2024 10:36 AM      CT Head Without Contrast Stroke Protocol    Result Date: 4/24/2024  1.  No acute intracranial process identified. 2.  Findings suggestive of mild chronic small vessel ischemic disease.    Electronically Signed By-Bryan Gillette MD On:4/24/2024 9:48 AM              Assessment & Plan   Assessment / Plan       Current Diagnosis:  Active Hospital Problems    Diagnosis     **PNA (pneumonia)     Acute kidney injury     Altered mental status     Diabetes type 2, controlled      Plan:   Patient presented to ER with weakness and altered mental status, stroke workup negative.  Symptoms most likely related to pneumonia and dehydration.  Acute kidney injury noted.  Baseline creatinine around 0.6-0.8.  At this point we will hydrate her, IV antibiotics, pneumonia workup.  Patient extremely weak, discussed with patient's family, both daughters present want to take her home when she is better.  Hold diabetic meds and monitor sugars rule out hypoglycemia  Further management based on clinical course, lab results      DVT prophylaxis:  Medical DVT prophylaxis orders are present.        GI Prophylaxis:       Pepcid    CODE STATUS:    Code Status (Patient has no pulse and is not breathing): CPR (Attempt to Resuscitate)  Medical Interventions (Patient has pulse or is breathing): Full Support    Admission Status:  I believe this patient meets inpatient status.             I have dictated this note utilizing Dragon Dictation.             Please note that portions of this note were completed with a voice recognition program.             Part of this note may be an electronic transcription/translation of spoken language to printed text         using the Dragon Dictation System.       Electronically signed by Collins Andrade MD, 04/24/24, 8:15 PM EDT.    Total time spent with in evaluation and management:

## 2024-04-25 NOTE — PLAN OF CARE
Goal Outcome Evaluation:                   FUNCTIONAL ASSESSMENT INSTRUMENT: Patient currently scored a level 6 of 7 on Functional Communication Measures for swallowing indicating a 1-19% limitation in function.    ASSESSMENT/ PLAN OF CARE:  Pt presents with functional oropharyngeal swallow.  No clinical signs or symptoms of aspiration noted.  Prolonged mastication with regular solids with scattered oral residue    REHAB POTENTIAL:  Pt has good rehab potential.  The following limitations may influence improvement/ length of tx medical status.    RECOMMENDATIONS:   1.   DIET: Diet downgraded to mechanical soft and thin liquids    2.  POSITION: 90 degrees upright for all intake    3.  COMPENSATORY STRATEGIES: Slow rate, small bites/drinks, monitor for pocketing, patient may need supervision during meals

## 2024-04-25 NOTE — THERAPY EVALUATION
Acute Care - Physical Therapy Initial Evaluation   [facility]     Patient Name: Shani Burnett  : 1932  MRN: [id number]  Today's Date: 2024      Visit Dx:     ICD-10-CM ICD-9-CM   1. Encephalopathy  G93.40 348.30   2. Leukocytosis, unspecified type  D72.829 288.60   3. Bronchiectasis without complication  J47.9 494.0   4. Pneumonia of both lungs due to infectious organism, unspecified part of lung  J18.9 483.8   5. Dehydration  E86.0 276.51   6. Difficulty walking  R26.2 719.7     Patient Active Problem List   Diagnosis    Arthritis    Bunion    Corns and callosity    Diabetes type 2, controlled    DVT (deep venous thrombosis)    Glaucoma    Hypertension    High cholesterol    Stroke    Chronic HFrEF (heart failure with reduced ejection fraction)    Bradycardia    Pacemaker    Pacemaker at end of battery life    PNA (pneumonia)    Acute kidney injury    Altered mental status    Hypokalemia     Past Medical History:   Diagnosis Date    Arthritis     Dementia     Depression     Diabetes mellitus     Disease of thyroid gland     Hypertension      Past Surgical History:   Procedure Laterality Date    BACK SURGERY      HYSTERECTOMY      PACEMAKER REPLACEMENT N/A 2023    Procedure: PPM generator change - dual;  Surgeon: Nic Seo MD;  Location: [facility] INVASIVE LOCATION;  Service: Cardiovascular;  Laterality: N/A;     PT Assessment (Last 12 Hours)       PT Evaluation and Treatment       Row Name 24 1000          Physical Therapy Time and Intention    Subjective Information complains of;weakness  -DP     Document Type evaluation  -DP     Mode of Treatment individual therapy;physical therapy  -DP     Patient Effort adequate  -DP       Row Name 24 1000          General Information    Patient Profile Reviewed yes  -DP     Patient Observations alert;agree to therapy  -DP     Prior Level of Function independent:;gait;transfer;bed mobility  daughter assists as needed on a daily basis  -DP      Equipment Currently Used at Home walker, rolling  -DP     Existing Precautions/Restrictions fall  -DP     Barriers to Rehab none identified  -DP       Row Name 04/25/24 1000          Living Environment    Current Living Arrangements home  -DP     Home Accessibility stairs to enter home  -DP     People in Home spouse  -DP       Row Name 04/25/24 1000          Cognition    Orientation Status (Cognition) oriented x 3  -DP     Follows Commands (Cognition) follows one-step commands;verbal cues/prompting required;increased processing time needed  -DP       Row Name 04/25/24 1000          Range of Motion (ROM)    Range of Motion ROM is WFL  -DP       Row Name 04/25/24 1000          Bed Mobility    Bed Mobility supine-sit-supine  -DP     Supine-Sit-Supine [place] (Bed Mobility) moderate assist (50% patient effort)  -DP     Bed Mobility, Safety Issues decreased use of arms for pushing/pulling;decreased use of legs for bridging/pushing  -DP     Assistive Device (Bed Mobility) bed rails;draw sheet;head of bed elevated  -DP       Row Name 04/25/24 1000          Transfers    Transfers sit-stand transfer  -DP       Row Name 04/25/24 1000          Sit-Stand Transfer    Sit-Stand Weston (Transfers) maximum assist (25% patient effort)  -DP       Row Name 04/25/24 1000          Gait/Stairs (Locomotion)    Comment, (Gait/Stairs) deferred- unable at this time  -DP       Row Name 04/25/24 1000          Balance    Balance Assessment standing dynamic balance  -DP     Dynamic Standing Balance maximum assist  -DP       Row Name             Wound 04/24/24 1615 medial coccyx Pressure Injury    Wound - Properties Group Placement Date: 04/24/24  - Placement Time: 1615 -EH Present on Original Admission: Y  -EH Orientation: medial  -EH Location: coccyx  -EH Primary Wound Type: Pressure inj  -EH    Retired Wound - Properties Group Placement Date: 04/24/24  - Placement Time: 1615 -EH Present on Original Admission: Y  -EH  Orientation: medial  -EH Location: coccyx  -EH Primary Wound Type: Pressure inj  -EH    Retired Wound - Properties Group Date first assessed: 04/24/24  - Time first assessed: 1615  -EH Present on Original Admission: Y  -EH Location: coccyx  -EH Primary Wound Type: Pressure inj  -EH      Row Name             Wound 04/24/24 1625 Right anterior greater trochanter    Wound - Properties Group Placement Date: 04/24/24  - Placement Time: 1625  -EH Present on Original Admission: Y  -EH Side: Right  -EH Orientation: anterior  -EH Location: greater trochanter  -EH    Retired Wound - Properties Group Placement Date: 04/24/24  - Placement Time: 1625  -EH Present on Original Admission: Y  -EH Side: Right  -EH Orientation: anterior  -EH Location: greater trochanter  -EH    Retired Wound - Properties Group Date first assessed: 04/24/24  - Time first assessed: 1625  -EH Present on Original Admission: Y  -EH Side: Right  -EH Location: greater trochanter  -EH      Row Name             Wound 04/24/24 1626 Left anterior greater trochanter    Wound - Properties Group Placement Date: 04/24/24  - Placement Time: 1626  -EH Present on Original Admission: Y  -EH Side: Left  -EH Orientation: anterior  -EH Location: greater trochanter  -EH    Retired Wound - Properties Group Placement Date: 04/24/24  - Placement Time: 1626  -EH Present on Original Admission: Y  -EH Side: Left  -EH Orientation: anterior  -EH Location: greater trochanter  -EH    Retired Wound - Properties Group Date first assessed: 04/24/24  - Time first assessed: 1626  -EH Present on Original Admission: Y  -EH Side: Left  -EH Location: greater trochanter  -EH      Row Name             Wound 04/24/24 1626 Left lower sternal    Wound - Properties Group Placement Date: 04/24/24  - Placement Time: 1626  -EH Present on Original Admission: Y  -EH Side: Left  -EH Orientation: lower  -EH Location: sternal  -EH    Retired Wound - Properties Group Placement Date: 04/24/24   -EH Placement Time: 1626  -EH Present on Original Admission: Y  -EH Side: Left  -EH Orientation: lower  -EH Location: sternal  -EH    Retired Wound - Properties Group Date first assessed: 04/24/24  -EH Time first assessed: 1626  -EH Present on Original Admission: Y  -EH Side: Left  -EH Location: sternal  -EH      Row Name             Wound 04/24/24 1627 Right lower sternal    Wound - Properties Group Placement Date: 04/24/24  -EH Placement Time: 1627  -EH Present on Original Admission: Y  -EH Side: Right  -EH Orientation: lower  -EH Location: sternal  -EH    Retired Wound - Properties Group Placement Date: 04/24/24  -EH Placement Time: 1627  -EH Present on Original Admission: Y  -EH Side: Right  -EH Orientation: lower  -EH Location: sternal  -EH    Retired Wound - Properties Group Date first assessed: 04/24/24  -EH Time first assessed: 1627  -EH Present on Original Admission: Y  -EH Side: Right  -EH Location: sternal  -EH      Row Name 04/25/24 1000          Plan of Care Review    Plan of Care Reviewed With patient  -DP     Outcome Evaluation Pt presents with decreased strength, transfers and functional mobility. Pt will benefit from inpatient PT services and placement in a rehab facility upon discharge.  -DP       Row Name 04/25/24 1000          Therapy Assessment/Plan (PT)    Rehab Potential (PT) good, to achieve stated therapy goals  -DP     Criteria for Skilled Interventions Met (PT) yes;meets criteria  -DP     Therapy Frequency (PT) daily  -DP     Predicted Duration of Therapy Intervention (PT) 10 days  -DP     Problem List (PT) problems related to;mobility;balance  -DP     Activity Limitations Related to Problem List (PT) unable to ambulate safely;unable to transfer safely  -DP       Row Name 04/25/24 1000          PT Evaluation Complexity    History, PT Evaluation Complexity no personal factors and/or comorbidities  -DP     Examination of Body Systems (PT Eval Complexity) total of 4 or more elements  -DP      Clinical Presentation (PT Evaluation Complexity) stable  -DP     Clinical Decision Making (PT Evaluation Complexity) low complexity  -DP     Overall Complexity (PT Evaluation Complexity) low complexity  -DP       Row Name 04/25/24 1000          Physical Therapy Goals    Bed Mobility Goal Selection (PT) bed mobility, PT goal 1  -DP     Transfer Goal Selection (PT) transfer, PT goal 1  -DP     Gait Training Goal Selection (PT) gait training, PT goal 1  -DP       Row Name 04/25/24 1000          Bed Mobility Goal 1 (PT)    Activity/Assistive Device (Bed Mobility Goal 1, PT) sit to supine/supine to sit  -DP     [place] Level/Cues Needed (Bed Mobility Goal 1, PT) contact guard required  -DP     Time Frame (Bed Mobility Goal 1, PT) 10 days  -DP       Row Name 04/25/24 1000          Transfer Goal 1 (PT)    Activity/Assistive Device (Transfer Goal 1, PT) sit-to-stand/stand-to-sit  -DP     [place] Level/Cues Needed (Transfer Goal 1, PT) minimum assist (75% or more patient effort)  -DP     Time Frame (Transfer Goal 1, PT) 10 days  -DP       Row Name 04/25/24 1000          Gait Training Goal 1 (PT)    Activity/Assistive Device (Gait Training Goal 1, PT) assistive device use;walker, rolling  -DP     [place] Level (Gait Training Goal 1, PT) contact guard required  -DP     Distance (Gait Training Goal 1, PT) 100  -DP     Time Frame (Gait Training Goal 1, PT) 10 days  -DP               User Key  (r) = Recorded By, (t) = Taken By, (c) = Cosigned By      Initials Name Provider Type    DP Tariq Eduardo, PT Physical Therapist    Sudha Murphy RN Registered Nurse                      PT Recommendation and Plan  Anticipated Discharge Disposition (PT): sub acute care setting  Planned Therapy Interventions (PT): balance training, bed mobility training, gait training, neuromuscular re-education, strengthening, transfer training  Therapy Frequency (PT): daily  Plan of Care Reviewed With: patient  Outcome Evaluation: Pt  presents with decreased strength, transfers and functional mobility. Pt will benefit from inpatient PT services and placement in a rehab facility upon discharge.   Outcome Measures       Row Name 04/25/24 1000             How much help from another person do you currently need...    Turning from your back to your side while in flat bed without using bedrails? 2  -DP      Moving from lying on back to sitting on the side of a flat bed without bedrails? 2  -DP      Moving to and from a bed to a chair (including a wheelchair)? 2  -DP      Standing up from a chair using your arms (e.g., wheelchair, bedside chair)? 2  -DP      Climbing 3-5 steps with a railing? 1  -DP      To walk in hospital room? 1  -DP      AM-PAC 6 Clicks Score (PT) 10  -DP      Highest Level of Mobility Goal 4 --> Transfer to chair/commode  -DP         Functional Assessment    Outcome Measure Options AM-PAC 6 Clicks Basic Mobility (PT)  -DP                User Key  (r) = Recorded By, (t) = Taken By, (c) = Cosigned By      Initials Name Provider Type    Tariq Michelle, PT Physical Therapist                     Time Calculation:    PT Charges       Row Name 04/25/24 1059             Time Calculation    PT Received On 04/25/24  -DP      PT Goal Re-Cert Due Date 05/04/24  -DP         Untimed Charges    PT Eval/Re-eval Minutes 40  -DP         Total Minutes    Untimed Charges Total Minutes 40  -DP       Total Minutes 40  -DP                User Key  (r) = Recorded By, (t) = Taken By, (c) = Cosigned By      Initials Name Provider Type    Tariq Michelle, PT Physical Therapist                      PT G-Codes  Outcome Measure Options: AM-PAC 6 Clicks Basic Mobility (PT)  AM-PAC 6 Clicks Score (PT): 10    Dhvani Jayce, PT  4/25/2024

## 2024-04-25 NOTE — PLAN OF CARE
Problem: Adult Inpatient Plan of Care  Goal: Plan of Care Review  Outcome: Ongoing, Progressing  Goal: Patient-Specific Goal (Individualized)  Outcome: Ongoing, Progressing  Goal: Absence of Hospital-Acquired Illness or Injury  Outcome: Ongoing, Progressing  Intervention: Identify and Manage Fall Risk  Intervention: Prevent and Manage VTE (Venous Thromboembolism) Risk  Intervention: Prevent Infection  Goal: Optimal Comfort and Wellbeing  Outcome: Ongoing, Progressing  Intervention: Provide Person-Centered Care  Goal: Readiness for Transition of Care  Outcome: Ongoing, Progressing     Problem: Skin Injury Risk Increased  Goal: Skin Health and Integrity  Outcome: Ongoing, Progressing   Goal Outcome Evaluation:   Patient confused, VSS, no significant changes in status during shift, will continue to monitor.

## 2024-04-25 NOTE — PROGRESS NOTES
[facility]     Progress Note    Patient Name: Shani Burnett  : 1932  MRN: [id number]  Primary Care Physician:  Tony Nelson MD  Date of admission: 2024      Subjective   Brief summary.  Admitted with altered mental status and pneumonia      HPI:  Feeling better today, slept better no acute issues, denies any chest pain, complains of some cough and achy all over    Review of Systems   Poor p.o. intake  No nausea vomiting or diarrhea, no fever chills, minimal cough, no shortness of breath.      Objective     Vitals:   Temp:  [97.7 °F (36.5 °C)-99 °F (37.2 °C)] 98.8 °F (37.1 °C)  Heart Rate:  [59-97] 59  Resp:  [17-24] 20  BP: (111-137)/() 114/52  Flow (L/min):  [2] 2    Physical Exam :     Elderly female not in acute distress  Sleepy  Heart regular  Lungs clear but diminished breath sounds  Abdomen soft nontender  Extremities no edema  Neuro answering questions appropriately appears to be at baseline with some dementia        Result Review:  I have personally reviewed the results from the time of this admission to 2024 08:43 EDT and agree with these findings:  [x]  Laboratory  [x]  Microbiology  radiology  []  EKG/Telemetry   []  Cardiology/Vascular   []  Pathology  []  Old records  []  Other:       Reviewed    Assessment / Plan       Active Hospital Problems:  Active Hospital Problems    Diagnosis     **PNA (pneumonia)     Acute kidney injury     Altered mental status     Diabetes type 2, controlled        Plan:   Patient significantly improved, kidney functions improving.  Creatinine normalized.  Replace potassium, discontinue fluids, monitor p.o. intake, continue antibiotics, cultures and sensitivities are pending for pneumonia workup.  PT OT consult.  Patient's family declined inpatient rehab and/nursing home, plan to discharge home on  continues to improve       DVT prophylaxis:  Medical DVT prophylaxis orders are present.        CODE STATUS:   Code Status (Patient has no pulse  and is not breathing): CPR (Attempt to Resuscitate)  Medical Interventions (Patient has pulse or is breathing): Full Support            Electronically signed by Collins Andrade MD, 04/25/24, 8:43 AM EDT.

## 2024-04-25 NOTE — PROGRESS NOTES
[facility]  [facility]    Routine Consult Follow-Up    Patient Name:   Shani Burnett  YOB: 1932  Identification Number:   MRN - [id number]  Date of Service:   04/25/2024 11:12:19    Diagnosis        R41.0 - Disorientation, unspecified        G93.41 - Encephalopathy Metabolic    Impression  This is a 89 year old female with dementia, HTN, HLD, who takes Eliquis BID, who presents to the emergency department at [place], [place] by EMS for complaints of confusion.    I called the daughter by phone but could not reach anyone.    Family told EMS that she awoke normal and became confused around 8-8:30 AM.    On exam she is oriented to self but not age or month. She is easily distractible and groans in pain. She is able to follow commands and lift all extremities. She is able to identify items on stroke cards without aphasia.    With lack of focality or aphasia, stroke is less suspected at this time. Would consider delirium secondary to infectious/metabolic process or other, compounded by underlying dementia. She is not a tPA/TNK candidate due to Eliquis use. Recommendations below.    4/25/24: She wakes up easily and follows commands, no focality, agree with treating for underlying metabolic/infectious causes of encephalopathy; avoid cefepime if at all possible. We will sign off- please call for questions/concerns/request for repeat eval. D/W dtr in room as well.    Our recommendations are outlined below    Consultations :  Toxic metabolic work up per primary team    Disposition :  Neurology will sign off. Reconsult if Needed.    Subjective  Dtr at bedside, she was weak- had been up all night the night before; no focality noted    Imaging  CT head without focus  CT chest- concern for pna       Examination  1A: Level of Consciousness - Alert; keenly responsive + 0  1B: Ask Month and Age - Could Not Answer Either Question Correctly + 2  1C: Blink  Eyes & Squeeze Hands - Performs Both Tasks + 0  2: Test Horizontal Extraocular Movements - Normal + 0  3: Test Visual Fields - No Visual Loss + 0  4: Test Facial Palsy (Use Grimace if Obtunded) - Normal symmetry + 0  5A: Test Left Arm Motor Drift - No Drift for 10 Seconds + 0  5B: Test Right Arm Motor Drift - No Drift for 10 Seconds + 0  6A: Test Left Leg Motor Drift - No Drift for 5 Seconds + 0  6B: Test Right Leg Motor Drift - No Drift for 5 Seconds + 0  7: Test Limb Ataxia (FNF/Heel-Shin) - No Ataxia + 0  8: Test Sensation - Normal; No sensory loss + 0  9: Test Language/Aphasia - Normal; No aphasia + 0  10: Test Dysarthria - Normal + 0  11: Test Extinction/Inattention - No abnormality + 0    NIHSS Score: 0  NIHSS Free Text : contractures in legs but lifts them both well           Patient/Family was informed the Neurology Consult would happen via TeleHealth consult by way of interactive audio and video telecommunications and consented to receiving care in this manner.    Telehealth Neurology consultation was provided. I spent 25 minutes providing telehealth care. This includes time spent for face to face visit via telemedicine, review of medical records, imaging studies and discussion of findings with providers, the patient and/or family.      Dr Nadiya Vann      [facility]  For Inpatient follow-up with [facility] physician please call [facility] [phone]. This is not an outpatient service. Post hospital discharge, please contact hospital directly.    Please do not communicate with [facility] physicians via secure chat. If you have any questions, Please contact [facility].  Please call or reconsult our service if there are any clinical or diagnostic changes.

## 2024-04-25 NOTE — PLAN OF CARE
Goal Outcome Evaluation:      Patient A&O to self on shift. VSS. No complaints from patient. Plan of care on going.

## 2024-04-25 NOTE — SIGNIFICANT NOTE
Wound Eval / Progress Noted    [facility]     Patient Name: Shani uBrnett  : 1932  MRN: [id number]  Today's Date: 2024                 Admit Date: 2024    Visit Dx:    ICD-10-CM ICD-9-CM   1. Encephalopathy  G93.40 348.30   2. Leukocytosis, unspecified type  D72.829 288.60   3. Bronchiectasis without complication  J47.9 494.0   4. Pneumonia of both lungs due to infectious organism, unspecified part of lung  J18.9 483.8   5. Dehydration  E86.0 276.51   6. Difficulty walking  R26.2 719.7         PNA (pneumonia)    Diabetes type 2, controlled    Acute kidney injury    Altered mental status    Hypokalemia        Past Medical History:   Diagnosis Date    Arthritis     Dementia     Depression     Diabetes mellitus     Disease of thyroid gland     Hypertension         Past Surgical History:   Procedure Laterality Date    BACK SURGERY      HYSTERECTOMY      PACEMAKER REPLACEMENT N/A 2023    Procedure: PPM generator change - dual;  Surgeon: Nic Seo MD;  [facility];  Service: Cardiovascular;  Laterality: N/A;         Physical Assessment:  Wound 24 1615 medial coccyx Pressure Injury (Active)   Wound Image   24 1200   Pressure Injury Stage DTPI 24 1200   Dressing Appearance open to air 24 1200   Closure None 24 1200   Base maroon/purple;dry;non-blanchable 24 1200   Periwound blistered;intact;redness;blanchable 24 1200   Periwound Temperature warm 24 1200   Periwound Skin Turgor soft 24 1200   Edges rolled/closed 24 1200   Drainage Amount none 24 1200   Care, Wound cleansed with;sterile normal saline 24 1200   Dressing Care dressing applied;petroleum-based;non-adherent;gauze;silicone;border dressing 24 1200   Periwound Care absorptive dressing applied 24 1200       Wound 24 1625 Right anterior greater trochanter (Active)   Wound Image   24 1625   Dressing Appearance open to air  04/25/24 1200   Closure None 04/25/24 1200   Base moist;red 04/25/24 1200   Periwound dry;intact 04/25/24 1200   Periwound Temperature warm 04/25/24 1200   Periwound Skin Turgor soft 04/25/24 1200   Edges open 04/25/24 1200   Drainage Characteristics/Odor serosanguineous 04/25/24 1200   Drainage Amount scant 04/25/24 1200   Care, Wound cleansed with;sterile normal saline 04/25/24 1200   Dressing Care open to air 04/25/24 1200       Wound 04/24/24 1626 Left anterior greater trochanter (Active)   Wound Image   04/24/24 1626   Dressing Appearance open to air 04/25/24 1200   Closure None 04/25/24 1200   Base moist;red 04/25/24 1200   Periwound dry;intact 04/25/24 1200   Periwound Temperature warm 04/25/24 1200   Periwound Skin Turgor soft 04/25/24 1200   Edges open 04/25/24 1200   Drainage Characteristics/Odor serosanguineous 04/25/24 1200   Drainage Amount scant 04/25/24 1200   Care, Wound cleansed with;sterile normal saline 04/25/24 1200   Dressing Care open to air 04/25/24 1200       Wound 04/24/24 1626 Left lower sternal (Active)   Wound Image   04/24/24 1627   Dressing Appearance open to air 04/25/24 1200   Closure None 04/25/24 1200   Base moist;red 04/25/24 1200   Periwound dry;intact 04/25/24 1200   Periwound Temperature warm 04/25/24 1200   Periwound Skin Turgor soft 04/25/24 1200   Edges open 04/25/24 1200   Drainage Characteristics/Odor serosanguineous 04/25/24 1200   Drainage Amount scant 04/25/24 1200   Care, Wound cleansed with;sterile normal saline 04/25/24 1200   Dressing Care open to air 04/25/24 1200       Wound 04/24/24 1627 Right lower sternal (Active)   Wound Image   04/24/24 1627   Dressing Appearance open to air 04/25/24 1200   Closure None 04/25/24 1200   Base moist;red 04/25/24 1200   Periwound dry;intact 04/25/24 1200   Periwound Temperature warm 04/25/24 1200   Periwound Skin Turgor soft 04/25/24 1200   Edges open 04/25/24 1200   Drainage Characteristics/Odor serosanguineous 04/25/24 1200    Drainage Amount scant 04/25/24 1200   Care, Wound cleansed with;sterile normal saline 04/25/24 1200   Dressing Care open to air 04/25/24 1200      Wound Check / Follow-up:   Patient seen today for wound consult. Patient is awake and alert at time of visit and agreeable to assessment. Patient's daughter is present at bedside.    Patient with MASD / erosion within skin folds to bilateral groin and bilateral breasts. There is linear tears noted. Moist, red tissue present. Cleansed with normal saline and gauze. Recommending skin care / topical treatment with application of zinc oxide paste.     Deep tissue injury noted to sacral spine with maroon / purple blistered and intact tissue. Periwound tissue is intact with blanchable redness. Cleansed with normal saline and gauze. Applied non-adherent petroleum based gauze and secured with silicone border dressing. Recommending daily dressing changes.    Heels intact without discoloration.    Recommending to implement every two hour turns, offload heels, keep patient free from moisture.      Impression: MASD / erosion within skin folds to bilateral groin and bilateral breasts. Deep tissue injury to sacral spine.    Short term goals:  Regain skin integrity, skin protection, moisture prevention, pressure reduction, skin care, topical treatment, daily dressing changes.    Jaida Lara RN    4/25/2024    15:47 EDT

## 2024-04-25 NOTE — PLAN OF CARE
Goal Outcome Evaluation:  Plan of Care Reviewed With: patient           Outcome Evaluation: Pt presents with decreased strength, transfers and functional mobility. Pt will benefit from inpatient PT services and placement in a rehab facility upon discharge.      Anticipated Discharge Disposition (PT): sub acute care setting

## 2024-04-26 LAB
ALBUMIN SERPL-MCNC: 2.9 G/DL (ref 3.5–5.2)
ALBUMIN/GLOB SERPL: 1 G/DL
ALP SERPL-CCNC: 48 U/L (ref 39–117)
ALT SERPL W P-5'-P-CCNC: 8 U/L (ref 1–33)
ANION GAP SERPL CALCULATED.3IONS-SCNC: 7.1 MMOL/L (ref 5–15)
AST SERPL-CCNC: 10 U/L (ref 1–32)
BASOPHILS # BLD AUTO: 0.09 10*3/MM3 (ref 0–0.2)
BASOPHILS NFR BLD AUTO: 0.7 % (ref 0–1.5)
BILIRUB SERPL-MCNC: 0.3 MG/DL (ref 0–1.2)
BUN SERPL-MCNC: 9 MG/DL (ref 8–23)
BUN/CREAT SERPL: 23.1 (ref 7–25)
CALCIUM SPEC-SCNC: 7.9 MG/DL (ref 8.2–9.6)
CHLORIDE SERPL-SCNC: 109 MMOL/L (ref 98–107)
CO2 SERPL-SCNC: 23.9 MMOL/L (ref 22–29)
CREAT SERPL-MCNC: 0.39 MG/DL (ref 0.57–1)
DEPRECATED RDW RBC AUTO: 46.9 FL (ref 37–54)
EGFRCR SERPLBLD CKD-EPI 2021: 93.6 ML/MIN/1.73
EOSINOPHIL # BLD AUTO: 0.37 10*3/MM3 (ref 0–0.4)
EOSINOPHIL NFR BLD AUTO: 3.1 % (ref 0.3–6.2)
ERYTHROCYTE [DISTWIDTH] IN BLOOD BY AUTOMATED COUNT: 14.7 % (ref 12.3–15.4)
GLOBULIN UR ELPH-MCNC: 2.8 GM/DL
GLUCOSE SERPL-MCNC: 173 MG/DL (ref 65–99)
HCT VFR BLD AUTO: 34.1 % (ref 34–46.6)
HGB BLD-MCNC: 10.9 G/DL (ref 12–15.9)
IMM GRANULOCYTES # BLD AUTO: 0.05 10*3/MM3 (ref 0–0.05)
IMM GRANULOCYTES NFR BLD AUTO: 0.4 % (ref 0–0.5)
LYMPHOCYTES # BLD AUTO: 3.49 10*3/MM3 (ref 0.7–3.1)
LYMPHOCYTES NFR BLD AUTO: 28.9 % (ref 19.6–45.3)
MCH RBC QN AUTO: 27.7 PG (ref 26.6–33)
MCHC RBC AUTO-ENTMCNC: 32 G/DL (ref 31.5–35.7)
MCV RBC AUTO: 86.8 FL (ref 79–97)
MONOCYTES # BLD AUTO: 1.15 10*3/MM3 (ref 0.1–0.9)
MONOCYTES NFR BLD AUTO: 9.5 % (ref 5–12)
NEUTROPHILS NFR BLD AUTO: 57.4 % (ref 42.7–76)
NEUTROPHILS NFR BLD AUTO: 6.92 10*3/MM3 (ref 1.7–7)
NRBC BLD AUTO-RTO: 0 /100 WBC (ref 0–0.2)
PLATELET # BLD AUTO: 307 10*3/MM3 (ref 140–450)
PMV BLD AUTO: 10.2 FL (ref 6–12)
POTASSIUM SERPL-SCNC: 3.4 MMOL/L (ref 3.5–5.2)
PROT SERPL-MCNC: 5.7 G/DL (ref 6–8.5)
RBC # BLD AUTO: 3.93 10*6/MM3 (ref 3.77–5.28)
SODIUM SERPL-SCNC: 140 MMOL/L (ref 136–145)
WBC NRBC COR # BLD AUTO: 12.07 10*3/MM3 (ref 3.4–10.8)

## 2024-04-26 PROCEDURE — 80053 COMPREHEN METABOLIC PANEL: CPT | Performed by: INTERNAL MEDICINE

## 2024-04-26 PROCEDURE — 25010000002 CEFTRIAXONE PER 250 MG: Performed by: INTERNAL MEDICINE

## 2024-04-26 PROCEDURE — 97530 THERAPEUTIC ACTIVITIES: CPT

## 2024-04-26 PROCEDURE — 85025 COMPLETE CBC W/AUTO DIFF WBC: CPT | Performed by: INTERNAL MEDICINE

## 2024-04-26 RX ADMIN — Medication 10 ML: at 21:15

## 2024-04-26 RX ADMIN — FAMOTIDINE 20 MG: 20 TABLET ORAL at 08:37

## 2024-04-26 RX ADMIN — POTASSIUM CHLORIDE 20 MEQ: 750 CAPSULE, EXTENDED RELEASE ORAL at 18:06

## 2024-04-26 RX ADMIN — Medication: at 18:11

## 2024-04-26 RX ADMIN — APIXABAN 2.5 MG: 2.5 TABLET, FILM COATED ORAL at 21:13

## 2024-04-26 RX ADMIN — ALPRAZOLAM 0.25 MG: 0.25 TABLET ORAL at 03:38

## 2024-04-26 RX ADMIN — LEVOTHYROXINE SODIUM 88 MCG: 88 TABLET ORAL at 05:40

## 2024-04-26 RX ADMIN — APIXABAN 2.5 MG: 2.5 TABLET, FILM COATED ORAL at 08:37

## 2024-04-26 RX ADMIN — POTASSIUM CHLORIDE 20 MEQ: 750 CAPSULE, EXTENDED RELEASE ORAL at 08:37

## 2024-04-26 RX ADMIN — POTASSIUM CHLORIDE, DEXTROSE MONOHYDRATE AND SODIUM CHLORIDE 75 ML/HR: 150; 5; 900 INJECTION, SOLUTION INTRAVENOUS at 21:29

## 2024-04-26 RX ADMIN — DOXYCYCLINE 100 MG: 100 INJECTION, POWDER, LYOPHILIZED, FOR SOLUTION INTRAVENOUS at 20:19

## 2024-04-26 RX ADMIN — DOXYCYCLINE 100 MG: 100 INJECTION, POWDER, LYOPHILIZED, FOR SOLUTION INTRAVENOUS at 08:37

## 2024-04-26 RX ADMIN — Medication: at 21:15

## 2024-04-26 RX ADMIN — TRAZODONE HYDROCHLORIDE 100 MG: 100 TABLET ORAL at 21:13

## 2024-04-26 RX ADMIN — CEFTRIAXONE SODIUM 1000 MG: 1 INJECTION, POWDER, FOR SOLUTION INTRAMUSCULAR; INTRAVENOUS at 13:48

## 2024-04-26 RX ADMIN — POTASSIUM CHLORIDE, DEXTROSE MONOHYDRATE AND SODIUM CHLORIDE 75 ML/HR: 150; 5; 900 INJECTION, SOLUTION INTRAVENOUS at 05:40

## 2024-04-26 NOTE — PLAN OF CARE
Goal Outcome Evaluation:         No significant change in patient's status on shift. Family remained at bedside. All needs met at this time. Plan of care on going.

## 2024-04-26 NOTE — PLAN OF CARE
Problem: Adult Inpatient Plan of Care  Goal: Plan of Care Review  Outcome: Ongoing, Progressing  Goal: Patient-Specific Goal (Individualized)  Outcome: Ongoing, Progressing  Goal: Absence of Hospital-Acquired Illness or Injury  Outcome: Ongoing, Progressing  Intervention: Identify and Manage Fall Risk  Intervention: Prevent and Manage VTE (Venous Thromboembolism) Risk  Intervention: Prevent Infection  Goal: Optimal Comfort and Wellbeing  Outcome: Ongoing, Progressing  Intervention: Provide Person-Centered Care  Goal: Readiness for Transition of Care  Outcome: Ongoing, Progressing     Problem: Skin Injury Risk Increased  Goal: Skin Health and Integrity  Outcome: Ongoing, Progressing   Goal Outcome Evaluation:      Patient alert to self only, VSS, no significant changes in status during shift, wound care performed per orders, will continue to monitor.

## 2024-04-26 NOTE — PROGRESS NOTES
[facility]     Progress Note    Patient Name: Shani Burnett  : 1932  MRN: [id number]  Primary Care Physician:  Tony Nelson MD  Date of admission: 2024    Subjective   Subjective     Chief Complaint: Discussed with the family regarding confusion disorientation extreme weakness at this point it is felt that patient probably will be benefited by being in a rehab family has requested rehab placement no acute problems at this time    HPI: Patient admitted with extreme weakness etiology not known patient appears to have dementia    Review of Systems   All systems were reviewed and negative except for: Patient with dementia extreme weakness    Objective   Objective     Vitals:   Temp:  [97.4 °F (36.3 °C)-98.9 °F (37.2 °C)] 98.9 °F (37.2 °C)  Heart Rate:  [59-94] 59  Resp:  [18] 18  BP: (131-152)/(43-56) 152/56  Flow (L/min):  [2] 2    Physical Exam    Constitutional: Awake, alert   Eyes: PERRLA, sclerae anicteric, no conjunctival injection   HENT: NCAT, mucous membranes moist   Neck: Supple, no thyromegaly, no lymphadenopathy, trachea midline   Respiratory: Clear to auscultation bilaterally, nonlabored respirations    Cardiovascular: RRR, no murmurs, rubs, or gallops, palpable pedal pulses bilaterally   Gastrointestinal: Positive bowel sounds, soft, nontender, nondistended   Musculoskeletal: No bilateral ankle edema, no clubbing or cyanosis to extremities   Psychiatric: Appropriate affect, cooperative   Neurologic: Oriented x 3, strength symmetric in all extremities, Cranial Nerves grossly intact to confrontation, speech clear   Skin: No rashes     Result Review    Result Review:  I have personally reviewed the results from the time of this admission to 2024 10:09 EDT and agree with these findings:  [x]  Laboratory  []  Microbiology  []  Radiology  []  EKG/Telemetry   []  Cardiology/Vascular   []  Pathology  []  Old records  []  Other:  Most notable findings include: No new findings    Assessment &  Plan   Assessment / Plan     Brief Patient Summary:  Shani Burnett is a 89 y.o. female who no new findings and weakness of unknown etiology with dementia    Active Hospital Problems:  Active Hospital Problems    Diagnosis     **PNA (pneumonia)     Hypokalemia     Acute kidney injury     Altered mental status     Diabetes type 2, controlled        Plan:   Will get  consult for possible rehab placement    DVT prophylaxis:  Medical DVT prophylaxis orders are present.        CODE STATUS:   Code Status (Patient has no pulse and is not breathing): CPR (Attempt to Resuscitate)  Medical Interventions (Patient has pulse or is breathing): Full Support    Disposition:  I expect patient to be discharged after the rehab bed is available.    Electronically signed by Catalino Silva MD, 04/26/24, 10:09 AM EDT.      Part of this note may be an electronic transcription/translation of spoken language to printed text using the Dragon Dictation System.

## 2024-04-26 NOTE — THERAPY TREATMENT NOTE
SNF - Physical Therapy Treatment Note   [facility]     Patient Name: Shani Burnett  : 1932  MRN: [id number]  Today's Date: 2024      Visit Dx:    ICD-10-CM ICD-9-CM   1. Encephalopathy  G93.40 348.30   2. Leukocytosis, unspecified type  D72.829 288.60   3. Bronchiectasis without complication  J47.9 494.0   4. Pneumonia of both lungs due to infectious organism, unspecified part of lung  J18.9 483.8   5. Dehydration  E86.0 276.51   6. Difficulty walking  R26.2 719.7     Patient Active Problem List   Diagnosis    Arthritis    Bunion    Corns and callosity    Diabetes type 2, controlled    DVT (deep venous thrombosis)    Glaucoma    Hypertension    High cholesterol    Stroke    Chronic HFrEF (heart failure with reduced ejection fraction)    Bradycardia    Pacemaker    Pacemaker at end of battery life    PNA (pneumonia)    Acute kidney injury    Altered mental status    Hypokalemia     Past Medical History:   Diagnosis Date    Arthritis     Dementia     Depression     Diabetes mellitus     Disease of thyroid gland     Hypertension      Past Surgical History:   Procedure Laterality Date    BACK SURGERY      HYSTERECTOMY      PACEMAKER REPLACEMENT N/A 2023    Procedure: PPM generator change - dual;  Surgeon: Nic Seo MD;  Location: [facility] INVASIVE LOCATION;  Service: Cardiovascular;  Laterality: N/A;       PT Assessment (Last 12 Hours)       PT Evaluation and Treatment       Row Name 24 1253          Physical Therapy Time and Intention    Document Type therapy note (daily note)  -WM     Mode of Treatment individual therapy;physical therapy  -WM     Patient Effort adequate  -WM     Symptoms Noted During/After Treatment fatigue  -WM       Row Name 24 125          Bed Mobility    Supine-Sit-Supine [place] (Bed Mobility) verbal cues;moderate assist (50% patient effort)  -WM     Bed Mobility, Safety Issues decreased use of legs for bridging/pushing  -WM     Assistive Device (Bed Mobility)  bed rails;draw sheet;head of bed elevated  -       Row Name 04/26/24 1253          Transfers    Transfers stand-sit transfer  -       Row Name 04/26/24 1253          Sit-Stand Transfer    Sit-Stand [place] (Transfers) verbal cues;maximum assist (25% patient effort)  -     Assistive Device (Sit-Stand Transfers) walker, front-wheeled  -WM       Row Name 04/26/24 1253          Stand-Sit Transfer    Stand-Sit [place] (Transfers) maximum assist (25% patient effort);verbal cues  -     Assistive Device (Stand-Sit Transfers) walker, front-wheeled  -       Row Name 04/26/24 1253          Gait/Stairs (Locomotion)    Comment, (Gait/Stairs) Pt stood 5-10 seconds. She declined to ambulate and requested to return to bed.  -       Row Name 04/26/24 1253          Safety Issues, Functional Mobility    Impairments Affecting Function (Mobility) balance;endurance/activity tolerance;strength  -       Row Name             Wound 04/24/24 1615 medial coccyx Pressure Injury    Wound - Properties Group Placement Date: 04/24/24  - Placement Time: 1615  -EH Present on Original Admission: Y  -EH Orientation: medial  -EH Location: coccyx  -EH Primary Wound Type: Pressure inj  -EH    Retired Wound - Properties Group Placement Date: 04/24/24  - Placement Time: 1615  -EH Present on Original Admission: Y  -EH Orientation: medial  -EH Location: coccyx  -EH Primary Wound Type: Pressure inj  -EH    Retired Wound - Properties Group Date first assessed: 04/24/24  - Time first assessed: 1615  -EH Present on Original Admission: Y  -EH Location: coccyx  -EH Primary Wound Type: Pressure inj  -EH      Row Name             Wound 04/24/24 1625 Right anterior greater trochanter    Wound - Properties Group Placement Date: 04/24/24  - Placement Time: 1625  -EH Present on Original Admission: Y  -EH Side: Right  -EH Orientation: anterior  -EH Location: greater trochanter  -EH    Retired Wound - Properties Group Placement Date:  04/24/24  - Placement Time: 1625  -EH Present on Original Admission: Y  -EH Side: Right  -EH Orientation: anterior  -EH Location: greater trochanter  -EH    Retired Wound - Properties Group Date first assessed: 04/24/24  - Time first assessed: 1625  -EH Present on Original Admission: Y  -EH Side: Right  -EH Location: greater trochanter  -EH      Row Name             Wound 04/24/24 1626 Left anterior greater trochanter    Wound - Properties Group Placement Date: 04/24/24  - Placement Time: 1626  -EH Present on Original Admission: Y  -EH Side: Left  -EH Orientation: anterior  -EH Location: greater trochanter  -EH    Retired Wound - Properties Group Placement Date: 04/24/24  - Placement Time: 1626  -EH Present on Original Admission: Y  -EH Side: Left  -EH Orientation: anterior  -EH Location: greater trochanter  -EH    Retired Wound - Properties Group Date first assessed: 04/24/24  - Time first assessed: 1626  -EH Present on Original Admission: Y  -EH Side: Left  -EH Location: greater trochanter  -EH      Row Name             Wound 04/24/24 1626 Left lower sternal    Wound - Properties Group Placement Date: 04/24/24  - Placement Time: 1626  -EH Present on Original Admission: Y  -EH Side: Left  -EH Orientation: lower  -EH Location: sternal  -EH    Retired Wound - Properties Group Placement Date: 04/24/24  - Placement Time: 1626  -EH Present on Original Admission: Y  -EH Side: Left  -EH Orientation: lower  -EH Location: sternal  -EH    Retired Wound - Properties Group Date first assessed: 04/24/24  - Time first assessed: 1626  -EH Present on Original Admission: Y  -EH Side: Left  -EH Location: sternal  -EH      Row Name             Wound 04/24/24 1627 Right lower sternal    Wound - Properties Group Placement Date: 04/24/24  - Placement Time: 1627  -EH Present on Original Admission: Y  -EH Side: Right  -EH Orientation: lower  -EH Location: sternal  -EH    Retired Wound - Properties Group Placement Date:  04/24/24  - Placement Time: 1627  -EH Present on Original Admission: Y  -EH Side: Right  -EH Orientation: lower  -EH Location: sternal  -EH    Retired Wound - Properties Group Date first assessed: 04/24/24  - Time first assessed: 1627  -EH Present on Original Admission: Y  -EH Side: Right  -EH Location: sternal  -EH      Row Name 04/26/24 1253          Positioning and Restraints    Pre-Treatment Position in bed  -WM     Post Treatment Position bed  -WM     In Bed fowlers;call light within reach;encouraged to call for assist;with family/caregiver  -       Row Name 04/26/24 1253          Progress Summary (PT)    Progress Toward Functional Goals (PT) progress toward functional goals is fair  -               User Key  (r) = Recorded By, (t) = Taken By, (c) = Cosigned By      Initials Name Provider Type    WM Dominic Limon PTA Physical Therapist Assistant    Sudha Murphy, RN Registered Nurse                  Section G              Section GG                         PT Recommendation and Plan     Progress Summary (PT)  Progress Toward Functional Goals (PT): progress toward functional goals is fair   Outcome Measures       Row Name 04/26/24 1305 04/25/24 1000          How much help from another person do you currently need...    Turning from your back to your side while in flat bed without using bedrails? 2  -WM 2  -DP     Moving from lying on back to sitting on the side of a flat bed without bedrails? 2  -WM 2  -DP     Moving to and from a bed to a chair (including a wheelchair)? 2  -WM 2  -DP     Standing up from a chair using your arms (e.g., wheelchair, bedside chair)? 2  -WM 2  -DP     Climbing 3-5 steps with a railing? 1  -WM 1  -DP     To walk in hospital room? 1  -WM 1  -DP     AM-PAC 6 Clicks Score (PT) 10  -WM 10  -DP     Highest Level of Mobility Goal 4 --> Transfer to chair/commode  -WM 4 --> Transfer to chair/commode  -DP        Functional Assessment    Outcome Measure Options -- AM-PAC 6 Clicks  Basic Mobility (PT)  -DP               User Key  (r) = Recorded By, (t) = Taken By, (c) = Cosigned By      Initials Name Provider Type    Dominic Dennis PTA Physical Therapist Assistant    Tariq Michelle PT Physical Therapist                     Time Calculation:    PT Charges       Row Name 04/26/24 1253             Time Calculation    PT Received On 04/26/24  -WM         Timed Charges    30441 - PT Therapeutic Activity Minutes 18  -WM         Total Minutes    Timed Charges Total Minutes 18  -WM       Total Minutes 18  -WM                User Key  (r) = Recorded By, (t) = Taken By, (c) = Cosigned By      Initials Name Provider Type    Dominic Dennis PTA Physical Therapist Assistant                      PT G-Codes  Outcome Measure Options: AM-PAC 6 Clicks Basic Mobility (PT)  AM-PAC 6 Clicks Score (PT): 10    Dominic Limon PTA  4/26/2024

## 2024-04-27 PROCEDURE — 25010000002 CEFTRIAXONE PER 250 MG: Performed by: INTERNAL MEDICINE

## 2024-04-27 RX ADMIN — CEFTRIAXONE SODIUM 1000 MG: 1 INJECTION, POWDER, FOR SOLUTION INTRAMUSCULAR; INTRAVENOUS at 12:51

## 2024-04-27 RX ADMIN — DOXYCYCLINE 100 MG: 100 INJECTION, POWDER, LYOPHILIZED, FOR SOLUTION INTRAVENOUS at 09:21

## 2024-04-27 RX ADMIN — APIXABAN 2.5 MG: 2.5 TABLET, FILM COATED ORAL at 21:03

## 2024-04-27 RX ADMIN — POTASSIUM CHLORIDE, DEXTROSE MONOHYDRATE AND SODIUM CHLORIDE 75 ML/HR: 150; 5; 900 INJECTION, SOLUTION INTRAVENOUS at 11:31

## 2024-04-27 RX ADMIN — Medication: at 17:21

## 2024-04-27 RX ADMIN — Medication: at 09:22

## 2024-04-27 RX ADMIN — Medication 10 ML: at 21:03

## 2024-04-27 RX ADMIN — POTASSIUM CHLORIDE 20 MEQ: 750 CAPSULE, EXTENDED RELEASE ORAL at 17:21

## 2024-04-27 RX ADMIN — Medication: at 21:04

## 2024-04-27 RX ADMIN — FAMOTIDINE 20 MG: 20 TABLET ORAL at 09:21

## 2024-04-27 RX ADMIN — POTASSIUM CHLORIDE 20 MEQ: 750 CAPSULE, EXTENDED RELEASE ORAL at 09:21

## 2024-04-27 RX ADMIN — TRAZODONE HYDROCHLORIDE 100 MG: 100 TABLET ORAL at 21:03

## 2024-04-27 RX ADMIN — LEVOTHYROXINE SODIUM 88 MCG: 88 TABLET ORAL at 06:09

## 2024-04-27 RX ADMIN — APIXABAN 2.5 MG: 2.5 TABLET, FILM COATED ORAL at 09:21

## 2024-04-27 NOTE — PLAN OF CARE
Goal Outcome Evaluation:      VSS. Patient is confused, alert to self only. IVF's and IV ABX given per orders. Skin care provided per orders. All needs met at this time.

## 2024-04-27 NOTE — PLAN OF CARE
Goal Outcome Evaluation:                      Patient is alert and oriented x1; patient is oriented to self only.  Vital signs stable.  No complaints of pain this shift.  IV antibiotics per MAR.  IV fluids per MAR.  Wound care and skin care per orders.  Continuing plan of care.

## 2024-04-27 NOTE — PROGRESS NOTES
[facility]     Progress Note    Patient Name: Shani Burnett  : 1932  MRN: [id number]  Primary Care Physician:  Tony Nelson MD  Date of admission: 2024    Subjective   Subjective     Chief Complaint: Dehydration improved BUN and creatinine have improved we will stop the IV fluids confused disoriented with dementia waiting for rehab place    HPI: Evelyn requested rehab and workup is in progress continue antibiotic    Review of Systems   All systems were reviewed and negative except for: Has been reviewed    Objective   Objective     Vitals:   Temp:  [97.6 °F (36.4 °C)-98.8 °F (37.1 °C)] 98.2 °F (36.8 °C)  Heart Rate:  [60] 60  Resp:  [16-18] 16  BP: (145-168)/(47-59) 168/54    Physical Exam    Constitutional: Awake, alert   Eyes: PERRLA, sclerae anicteric, no conjunctival injection   HENT: NCAT, mucous membranes moist   Neck: Supple, no thyromegaly, no lymphadenopathy, trachea midline   Respiratory: Clear to auscultation bilaterally, nonlabored respirations    Cardiovascular: RRR, no murmurs, rubs, or gallops, palpable pedal pulses bilaterally   Gastrointestinal: Positive bowel sounds, soft, nontender, nondistended   Musculoskeletal: No bilateral ankle edema, no clubbing or cyanosis to extremities   Psychiatric: Appropriate affect, cooperative   Neurologic: Oriented x 3, strength symmetric in all extremities, Cranial Nerves grossly intact to confrontation, speech clear   Skin: No rashes   No change  Result Review    Result Review:  I have personally reviewed the results from the time of this admission to 2024 12:49 EDT and agree with these findings:  [x]  Laboratory  []  Microbiology  []  Radiology  []  EKG/Telemetry   []  Cardiology/Vascular   []  Pathology  []  Old records  []  Other:  Most notable findings include: Has been reviewed and discussed with the family    Assessment & Plan   Assessment / Plan     Brief Patient Summary:  Shani Burnett is a 89 y.o. female who discussed the case with her   [facility]  DAILY TREATMENT NOTE    Date: 4/3/2019  Patients Name:  Lindley Libman  YOB: 2015 (3 y.o.)  Gender:  male  MRN:  [id number]  Account #: [de-identified]    Diagnosis: R44.8 - Other symptoms and signs involving general sensations and perceptions  Rehab Diagnosis/Code: R44.8 - Other symptoms and signs involving general sensations and perceptions, R62 - Developmental Delay, P94.2 - Hypotonia, R63.3 Feeding Difficulties       INSURANCE  Insurance Information: [employer]   Total number of visits approved: 30   Total number of visits to date: 6      PAIN  [x]No     []Yes      Location: N/A  Pain Rating (0-10 pain scale):N/A  Pain Description: N/A    SUBJECTIVE  Patient presents to clinic with caregiver. Co-tx for 15 minutes of session with SLP. GOALS/ TREATMENT SESSION:  1. Patient/Caregiver will be independent with home exercise program  2. Patient will engage in turn taking activities with therapist across 4 turns 2/3 trials. -- Therapist attempting to engage pt in turn taking with play foods, pt pulling food from therapist hand to line up. 3. Patient will display improved core/UB strength evidenced by completing 5 pushups. --   4. Patient will imitate vertical/horizontal strokes using a variety of media 3/4 opportunities. -- Pt with one attempt at bilateral hand use this date- attempted to string bead, placed bead on dowel prior to throwing. 5. Patient will attempt family foods at meal time with strategies given by therapist 5/7 nights of the week. -- Discussion with father- pt inconsistent with foods preferred. Discussed offering \"challenge\" foods at all meals with preferred foods. 6. Patient will display regulation of sensory input utilizing adaptive strategies to incorporate into a sensory diet (OT will trial a variety of proprio input items such as pressure vest, weighted blanket, wrist/ankle weights to make recommendations for home use. )-- EDUCATION  Education provided to patient/family/caregiver:      [x]Yes/New education    []Yes/Continued Review of prior education   __No  If yes Education Provided:  See goal 5    Method of Education:     [x]Discussion     [x]Demonstration    [] Written     []Other  Evaluation of Patients Response to Education:        [x]Patient and or caregiver verbalized understanding  []Patient and or Caregiver Demonstrated without assistance   []Patient and or Caregiver Demonstrated with assistance  []Needs additional instruction to demonstrate understanding of education    ASSESSMENT  Patient tolerated todays treatment session:    [x] Good   []  Fair   []  Poor  Limitations/difficulties with treatment session due to:   []Pain     []Fatigue     []Other medical complications     []Other  Goal Assessment: [] No Change    [x]Improved  Comments:    PLAN  [x]Continue with current plan of care  [][facility]  [][facility] per [facility] request  [] Change Treatment plan:  [] Insurance hold  __ Other     TIME   Time Treatment session was INITIATED 10:40   Time Treatment session was STOPPED 11:15       Total TIMED minutes 35   Total UNTIMED minutes 0   Total TREATMENT minutes 35     Charges: TA2  Electronically signed by:   BOBBY Cuadra/SHEYLA           Date:4/3/2019 daughter and patient would like to go to the rehab     Active Hospital Problems:  Active Hospital Problems    Diagnosis     **PNA (pneumonia)     Hypokalemia     Acute kidney injury     Altered mental status     Diabetes type 2, controlled        Plan:   Will stop the IV fluids and continue antibiotics    DVT prophylaxis:  Medical DVT prophylaxis orders are present.        CODE STATUS:   Code Status (Patient has no pulse and is not breathing): CPR (Attempt to Resuscitate)  Medical Interventions (Patient has pulse or is breathing): Full Support    Disposition:  I expect patient to be discharged waiting for rehab placement.    Electronically signed by Catalino Silva MD, 04/27/24, 12:49 PM EDT.      Part of this note may be an electronic transcription/translation of spoken language to printed text using the Dragon Dictation System.

## 2024-04-28 PROCEDURE — 25010000002 CEFTRIAXONE PER 250 MG: Performed by: INTERNAL MEDICINE

## 2024-04-28 RX ADMIN — Medication: at 21:02

## 2024-04-28 RX ADMIN — LEVOTHYROXINE SODIUM 88 MCG: 88 TABLET ORAL at 05:16

## 2024-04-28 RX ADMIN — Medication: at 09:54

## 2024-04-28 RX ADMIN — APIXABAN 2.5 MG: 2.5 TABLET, FILM COATED ORAL at 09:54

## 2024-04-28 RX ADMIN — APIXABAN 2.5 MG: 2.5 TABLET, FILM COATED ORAL at 21:02

## 2024-04-28 RX ADMIN — Medication: at 16:50

## 2024-04-28 RX ADMIN — POTASSIUM CHLORIDE, DEXTROSE MONOHYDRATE AND SODIUM CHLORIDE 75 ML/HR: 150; 5; 900 INJECTION, SOLUTION INTRAVENOUS at 01:30

## 2024-04-28 RX ADMIN — CEFTRIAXONE SODIUM 1000 MG: 1 INJECTION, POWDER, FOR SOLUTION INTRAMUSCULAR; INTRAVENOUS at 13:23

## 2024-04-28 RX ADMIN — FAMOTIDINE 20 MG: 20 TABLET ORAL at 09:54

## 2024-04-28 RX ADMIN — TRAZODONE HYDROCHLORIDE 100 MG: 100 TABLET ORAL at 21:02

## 2024-04-28 NOTE — PLAN OF CARE
Goal Outcome Evaluation:                      Patient is alert and oriented x1; patient is oriented to self only.  Vital signs stable.  No complaints of pain this shift.  IV antibiotics per MAR.  Fluids discontinued this shift per MAR.  Wound and skin care per orders.  Continuing plan of care.

## 2024-04-28 NOTE — PLAN OF CARE
Goal Outcome Evaluation:      VSS. Patient is oriented to self only. IVF's per orders. No c/o pain. Skin care completed per orders. All needs met at this time.

## 2024-04-28 NOTE — PROGRESS NOTES
[facility]     Progress Note    Patient Name: Shani Burnett  : 1932  MRN: [id number]  Primary Care Physician:  Tony Nelson MD  Date of admission: 2024      Subjective   Brief summary.  Admitted with altered mental status and pneumonia      HPI:  More awake and alert, daughter at bedside.  Extremely weak and able to get up and take 2 steps.  Daughter concerned and would like to get into rehab for her.  Patient denies any chest pain, no shortness of breath.  Anxious.      Review of Systems     No fever chills  Poor appetite and poor p.o. intake  Denies any nausea minimal cough, no shortness of breath.      Objective     Vitals:   Temp:  [97.3 °F (36.3 °C)-98.6 °F (37 °C)] 97.3 °F (36.3 °C)  Heart Rate:  [60-69] 60  Resp:  [15-16] 16  BP: (136-173)/(56-72) 136/56    Physical Exam :     Elderly female not in acute distress  Oral mucosa dry.  Heart regular  Lungs clear but diminished breath sounds  Abdomen soft nontender  Extremities no edema  Awake and alert.      Result Review:  I have personally reviewed the results from the time of this admission to 2024 11:45 EDT and agree with these findings:  [x]  Laboratory  [x]  Microbiology  radiology  []  EKG/Telemetry   []  Cardiology/Vascular   []  Pathology  []  Old records  []  Other:           Assessment / Plan       Active Hospital Problems:  Active Hospital Problems    Diagnosis     **PNA (pneumonia)     Hypokalemia     Acute kidney injury     Altered mental status     Diabetes type 2, controlled        Plan:   Patient improving, discontinue IV fluids.  Labs improved, no recent labs, will proceed with labs again.  PT OT.  Inpatient rehab.  Possible long-term placement.  Discussed with daughter.       DVT prophylaxis:  Medical DVT prophylaxis orders are present.        CODE STATUS:   Code Status (Patient has no pulse and is not breathing): CPR (Attempt to Resuscitate)  Medical Interventions (Patient has pulse or is breathing): Full  Support              Electronically signed by Collins Andrade MD, 04/28/24, 11:47 AM EDT.

## 2024-04-29 VITALS
HEART RATE: 60 BPM | BODY MASS INDEX: 22.41 KG/M2 | SYSTOLIC BLOOD PRESSURE: 136 MMHG | HEIGHT: 65 IN | OXYGEN SATURATION: 94 % | RESPIRATION RATE: 18 BRPM | DIASTOLIC BLOOD PRESSURE: 54 MMHG | TEMPERATURE: 97.7 F | WEIGHT: 134.48 LBS

## 2024-04-29 PROBLEM — E87.6 HYPOKALEMIA: Status: RESOLVED | Noted: 2024-04-25 | Resolved: 2024-04-29

## 2024-04-29 PROBLEM — J15.7 MYCOPLASMA PNEUMONIA: Status: ACTIVE | Noted: 2024-04-29

## 2024-04-29 PROBLEM — R41.82 ALTERED MENTAL STATUS: Status: RESOLVED | Noted: 2024-04-24 | Resolved: 2024-04-29

## 2024-04-29 PROBLEM — J15.7 MYCOPLASMA PNEUMONIA: Status: RESOLVED | Noted: 2024-04-29 | Resolved: 2024-04-29

## 2024-04-29 PROBLEM — N17.9 ACUTE KIDNEY INJURY: Status: RESOLVED | Noted: 2024-04-24 | Resolved: 2024-04-29

## 2024-04-29 LAB
ALBUMIN SERPL-MCNC: 2.8 G/DL (ref 3.5–5.2)
ALBUMIN/GLOB SERPL: 1 G/DL
ALP SERPL-CCNC: 56 U/L (ref 39–117)
ALT SERPL W P-5'-P-CCNC: 9 U/L (ref 1–33)
ANION GAP SERPL CALCULATED.3IONS-SCNC: 8.4 MMOL/L (ref 5–15)
AST SERPL-CCNC: 17 U/L (ref 1–32)
BACTERIA SPEC AEROBE CULT: NORMAL
BACTERIA SPEC AEROBE CULT: NORMAL
BASOPHILS # BLD AUTO: 0.06 10*3/MM3 (ref 0–0.2)
BASOPHILS NFR BLD AUTO: 0.4 % (ref 0–1.5)
BILIRUB SERPL-MCNC: 0.3 MG/DL (ref 0–1.2)
BUN SERPL-MCNC: 4 MG/DL (ref 8–23)
BUN/CREAT SERPL: 9.3 (ref 7–25)
CALCIUM SPEC-SCNC: 8.3 MG/DL (ref 8.2–9.6)
CHLORIDE SERPL-SCNC: 103 MMOL/L (ref 98–107)
CO2 SERPL-SCNC: 26.6 MMOL/L (ref 22–29)
CREAT SERPL-MCNC: 0.43 MG/DL (ref 0.57–1)
DEPRECATED RDW RBC AUTO: 45.1 FL (ref 37–54)
EGFRCR SERPLBLD CKD-EPI 2021: 91.4 ML/MIN/1.73
EOSINOPHIL # BLD AUTO: 0.56 10*3/MM3 (ref 0–0.4)
EOSINOPHIL NFR BLD AUTO: 4.2 % (ref 0.3–6.2)
ERYTHROCYTE [DISTWIDTH] IN BLOOD BY AUTOMATED COUNT: 14.6 % (ref 12.3–15.4)
GLOBULIN UR ELPH-MCNC: 2.8 GM/DL
GLUCOSE SERPL-MCNC: 106 MG/DL (ref 65–99)
HCT VFR BLD AUTO: 35 % (ref 34–46.6)
HGB BLD-MCNC: 11.5 G/DL (ref 12–15.9)
IMM GRANULOCYTES # BLD AUTO: 0.07 10*3/MM3 (ref 0–0.05)
IMM GRANULOCYTES NFR BLD AUTO: 0.5 % (ref 0–0.5)
LYMPHOCYTES # BLD AUTO: 4.76 10*3/MM3 (ref 0.7–3.1)
LYMPHOCYTES NFR BLD AUTO: 35.6 % (ref 19.6–45.3)
MCH RBC QN AUTO: 28 PG (ref 26.6–33)
MCHC RBC AUTO-ENTMCNC: 32.9 G/DL (ref 31.5–35.7)
MCV RBC AUTO: 85.2 FL (ref 79–97)
MONOCYTES # BLD AUTO: 1.18 10*3/MM3 (ref 0.1–0.9)
MONOCYTES NFR BLD AUTO: 8.8 % (ref 5–12)
NEUTROPHILS NFR BLD AUTO: 50.5 % (ref 42.7–76)
NEUTROPHILS NFR BLD AUTO: 6.75 10*3/MM3 (ref 1.7–7)
NRBC BLD AUTO-RTO: 0 /100 WBC (ref 0–0.2)
PLATELET # BLD AUTO: 273 10*3/MM3 (ref 140–450)
PMV BLD AUTO: 11.6 FL (ref 6–12)
POTASSIUM SERPL-SCNC: 3.8 MMOL/L (ref 3.5–5.2)
PROT SERPL-MCNC: 5.6 G/DL (ref 6–8.5)
RBC # BLD AUTO: 4.11 10*6/MM3 (ref 3.77–5.28)
SODIUM SERPL-SCNC: 138 MMOL/L (ref 136–145)
WBC NRBC COR # BLD AUTO: 13.38 10*3/MM3 (ref 3.4–10.8)

## 2024-04-29 PROCEDURE — 80053 COMPREHEN METABOLIC PANEL: CPT | Performed by: INTERNAL MEDICINE

## 2024-04-29 PROCEDURE — 25010000002 CEFTRIAXONE PER 250 MG: Performed by: INTERNAL MEDICINE

## 2024-04-29 PROCEDURE — 85025 COMPLETE CBC W/AUTO DIFF WBC: CPT | Performed by: INTERNAL MEDICINE

## 2024-04-29 PROCEDURE — 97110 THERAPEUTIC EXERCISES: CPT

## 2024-04-29 PROCEDURE — 97530 THERAPEUTIC ACTIVITIES: CPT

## 2024-04-29 RX ORDER — AMLODIPINE BESYLATE 5 MG/1
5 TABLET ORAL
Status: DISCONTINUED | OUTPATIENT
Start: 2024-04-29 | End: 2024-04-30 | Stop reason: HOSPADM

## 2024-04-29 RX ORDER — DOXYCYCLINE HYCLATE 100 MG/1
100 CAPSULE ORAL 2 TIMES DAILY
Qty: 14 CAPSULE | Refills: 0 | Status: SHIPPED | OUTPATIENT
Start: 2024-04-29 | End: 2024-05-06

## 2024-04-29 RX ADMIN — Medication: at 20:35

## 2024-04-29 RX ADMIN — LEVOTHYROXINE SODIUM 88 MCG: 88 TABLET ORAL at 08:26

## 2024-04-29 RX ADMIN — FAMOTIDINE 20 MG: 20 TABLET ORAL at 08:26

## 2024-04-29 RX ADMIN — AMLODIPINE BESYLATE 5 MG: 5 TABLET ORAL at 18:46

## 2024-04-29 RX ADMIN — APIXABAN 2.5 MG: 2.5 TABLET, FILM COATED ORAL at 08:26

## 2024-04-29 RX ADMIN — Medication 10 ML: at 20:30

## 2024-04-29 RX ADMIN — TRAZODONE HYDROCHLORIDE 100 MG: 100 TABLET ORAL at 20:29

## 2024-04-29 RX ADMIN — APIXABAN 2.5 MG: 2.5 TABLET, FILM COATED ORAL at 20:29

## 2024-04-29 RX ADMIN — CEFTRIAXONE SODIUM 1000 MG: 1 INJECTION, POWDER, FOR SOLUTION INTRAMUSCULAR; INTRAVENOUS at 14:41

## 2024-04-29 RX ADMIN — Medication: at 14:41

## 2024-04-29 RX ADMIN — Medication 10 ML: at 08:27

## 2024-04-29 RX ADMIN — Medication: at 08:27

## 2024-04-29 NOTE — PLAN OF CARE
Goal Outcome Evaluation:                      Alert to self only this shift. No complaints of pain. Wound care complete per order. IV antibiotics complete per MAR. Report called to Meño Briggs. No new orders at this time.

## 2024-04-29 NOTE — PLAN OF CARE
Goal Outcome Evaluation:      VSS. Patient is pleasantly confused, alert to self only. No c/o pain. Skin care per orders. Bladder scanned pt and straight cath x1, 300mL clear, yellow urine emptied. Plan of care ongoing.

## 2024-04-29 NOTE — DISCHARGE SUMMARY
[facility]         DISCHARGE SUMMARY    Patient Name: Shani Burnett  : 1932  MRN: [id number]    Date of Admission: 2024  Date of Discharge:   Primary Care Physician: Tony Nelson MD    Consults       Date and Time Order Name Status Description    2024 12:30 PM General MD Inpatient Consult              Presenting Problem:   Dehydration [E86.0]  Encephalopathy [G93.40]  Bronchiectasis without complication [J47.9]  PNA (pneumonia) [J18.9]  Pneumonia of both lungs due to infectious organism, unspecified part of lung [J18.9]  Leukocytosis, unspecified type [D72.829]    Active and Resolved Hospital Problems:  Active Hospital Problems    Diagnosis POA    **PNA (pneumonia) [J18.9] Yes    Diabetes type 2, controlled [E11.9] Yes      Resolved Hospital Problems    Diagnosis POA    Mycoplasma pneumonia [J15.7] Yes    Hypokalemia [E87.6] No    Acute kidney injury [N17.9] Yes    Altered mental status [R41.82] Yes         Hospital Course     Hospital Course:  Shani Burnett is a 89 y.o. female mated to hospital for weakness and confusion.  Workup initially showed hypokalemia and acute kidney injury with high white cell count.  Also showed pneumonia on CT.  Patient admitted to hospital started on IV antibiotic patient is allergic to erythromycin.  She was started on Rocephin.  Further cultures negative but IgM for mycoplasma was positive.    Patient white cell count improved from 20,000-12,000.  She has significantly improved her mental status improved, confusion and weakness related to pneumonia.    Her latest labs from today shows a potassium of 3.8 creatinine is normalized at 0.43 she had acute kidney injury on admission with creatinine of 1.13.    Patient is feeling weak and having difficulty with daily ADLs she lives at home with her daughter.  Daughter requested inpatient rehab patient extremely weak PT OT was consulted.  She has been approved for inpatient rehab and she will be  discharged to rehab stay at nursing home today    DISCHARGE Follow Up Recommendations for labs and diagnostics:   Discharged to inpatient rehab  Heart healthy diet  PT OT  Monitor labs in 3 days with CBC and chemistry      Day of Discharge     Vital Signs:  Temp:  [97.6 °F (36.4 °C)-98.6 °F (37 °C)] 98.6 °F (37 °C)  Heart Rate:  [59-60] 60  Resp:  [14-24] 18  BP: (133-182)/(45-61) 164/45    Physical Exam:    Elderly female not in acute distress.  Awake alert and oriented today.  Heart regular.  Lungs clear, diminished breath sounds.  Abdomen is obese and soft nontender.  Extremities no edema.      Pertinent  and/or Most Recent Results     LAB RESULTS:      Lab 04/29/24 0444 04/26/24  0516 04/25/24 0446 04/24/24  1129 04/24/24  0939   WBC 13.38* 12.07* 12.92*  --  23.78*   HEMOGLOBIN 11.5* 10.9* 10.7*  --  13.6   HEMATOCRIT 35.0 34.1 33.0*  --  41.0   PLATELETS 273 307 323  --  406   NEUTROS ABS 6.75 6.92 8.02*  --  15.46*   IMMATURE GRANS (ABS) 0.07* 0.05 0.08*  --   --    LYMPHS ABS 4.76* 3.49* 3.21*  --   --    MONOS ABS 1.18* 1.15* 1.28*  --   --    EOS ABS 0.56* 0.37 0.27  --  0.48*   MCV 85.2 86.8 84.8  --  83.3   PROCALCITONIN  --   --   --   --  0.08   LACTATE  --   --   --  1.5  --    PROTIME  --   --   --   --  15.7*   APTT  --   --   --   --  25.4         Lab 04/29/24 0444 04/26/24  0516 04/25/24  0446 04/24/24  0939   SODIUM 138 140 142 135*   POTASSIUM 3.8 3.4* 3.0* 3.4*   CHLORIDE 103 109* 109* 99   CO2 26.6 23.9 24.0 21.1*   ANION GAP 8.4 7.1 9.0 14.9   BUN 4* 9 27* 51*   CREATININE 0.43* 0.39* 0.47* 1.13*   EGFR 91.4 93.6 89.4 45.7*   GLUCOSE 106* 173* 147* 177*   CALCIUM 8.3 7.9* 8.2 9.0   MAGNESIUM  --   --   --  2.0   TSH  --   --   --  0.071*         Lab 04/29/24  0444 04/26/24  0516 04/25/24  0446 04/24/24  0939   TOTAL PROTEIN 5.6* 5.7* 5.6* 6.7   ALBUMIN 2.8* 2.9* 3.0* 3.5   GLOBULIN 2.8 2.8 2.6 3.2   ALT (SGPT) 9 8 7 8   AST (SGOT) 17 10 11 15   BILIRUBIN 0.3 0.3 0.4 0.4   ALK PHOS 56 48 44  53         Lab 04/24/24  0939   HSTROP T 19*   PROTIME 15.7*   INR 1.23*             Lab 04/24/24  1129   ABO TYPING A   RH TYPING Negative   ANTIBODY SCREEN Negative         Brief Urine Lab Results  (Last result in the past 365 days)        Color   Clarity   Blood   Leuk Est   Nitrite   Protein   CREAT   Urine HCG        04/24/24 1015 Yellow   Clear   Negative   Trace   Negative   Trace                 Microbiology Results (last 10 days)       Procedure Component Value - Date/Time    Blood Culture - Blood, Arm, Right [[id number]]  (Normal) Collected: 04/24/24 1154    Lab Status: Final result Specimen: Blood from Arm, Right Updated: 04/29/24 1201     Blood Culture No growth at 5 days    Blood Culture - Blood, Arm, Left [[id number]]  (Normal) Collected: 04/24/24 1129    Lab Status: Final result Specimen: Blood from Arm, Left Updated: 04/29/24 1145     Blood Culture No growth at 5 days    COVID-19, FLU A/B, RSV PCR 1 HR TAT - Swab, Nasopharynx [[id number]]  (Normal) Collected: 04/24/24 1017    Lab Status: Final result Specimen: Swab from Nasopharynx Updated: 04/24/24 1138     COVID19 Not Detected     Influenza A PCR Not Detected     Influenza B PCR Not Detected     RSV, PCR Not Detected    Narrative:      Fact sheet for providers: https://www.fda.gov/media/114871/download    Fact sheet for patients: https://www.fda.gov/media/827808/download    Test performed by PCR.    S. Pneumo Ag Urine or CSF - Urine, Urine, Clean Catch [[id number]]  (Normal) Collected: 04/24/24 1015    Lab Status: Final result Specimen: Urine, Clean Catch Updated: 04/24/24 1332     Strep Pneumo Ag Negative    Legionella Antigen, Urine - Urine, Urine, Clean Catch [[id number]]  (Normal) Collected: 04/24/24 1015    Lab Status: Final result Specimen: Urine, Clean Catch Updated: 04/24/24 1331     LEGIONELLA ANTIGEN, URINE Negative    Urine Culture - Urine, Urine, Clean Catch [[id number]]  (Normal) Collected: 04/24/24 1015    Lab Status: Final result Specimen:  Urine, Clean Catch Updated: 04/25/24 1007     Urine Culture No growth    Mycoplasma Pneumoniae Antibody, IgM - Blood, [[id number]]  (Abnormal) Collected: 04/24/24 0939    Lab Status: Final result Specimen: Blood Updated: 04/24/24 1346     Mycoplasma pneumo IgM Positive            PROCEDURES:    CT Chest Without Contrast Diagnostic    Result Date: 4/24/2024  Impression: Impression: 1.  Bilateral bronchiectasis with scattered small nodular densities and tree-in-bud nodules, likely a chronic infectious or inflammatory process. However, a superimposed acute component is possible. 2.  No acute process identified within abdomen/pelvis. 3.  Moderate hiatal hernia. 4.  Sigmoid diverticulosis. 5.  Prominent pelvic floor laxity.     Electronically Signed ByQuinn Gillette MD On:4/24/2024 12:14 PM      CT Abdomen Pelvis Without Contrast    Result Date: 4/24/2024  Impression: Impression: 1.  Bilateral bronchiectasis with scattered small nodular densities and tree-in-bud nodules, likely a chronic infectious or inflammatory process. However, a superimposed acute component is possible. 2.  No acute process identified within abdomen/pelvis. 3.  Moderate hiatal hernia. 4.  Sigmoid diverticulosis. 5.  Prominent pelvic floor laxity.     Electronically Signed ByQuinn Gillette MD On:4/24/2024 12:14 PM      XR Chest 1 View    Result Date: 4/24/2024  Impression: No acute cardiopulmonary process identified.   Electronically Signed ByQuinn Gillette MD On:4/24/2024 10:36 AM      CT Head Without Contrast Stroke Protocol    Result Date: 4/24/2024  Impression: 1.  No acute intracranial process identified. 2.  Findings suggestive of mild chronic small vessel ischemic disease.   Electronically Signed ByQuinn Gillette MD On:4/24/2024 9:48 AM                   Labs Pending at Discharge:        Discharge Details        Discharge Medications        New Medications        Instructions Start Date   doxycycline 100 MG capsule  Commonly known  as: VIBRAMYCIN   100 mg, Oral, 2 Times Daily             Continue These Medications        Instructions Start Date   albuterol sulfate  (90 Base) MCG/ACT inhaler  Commonly known as: PROVENTIL HFA;VENTOLIN HFA;PROAIR HFA   2 puffs, Inhalation, Every 4 Hours PRN      amLODIPine 5 MG tablet  Commonly known as: NORVASC   Take 1 tablet by mouth Daily.      busPIRone 5 MG tablet  Commonly known as: BUSPAR   Take 1 tablet by mouth 2 (Two) Times a Day.      Eliquis 2.5 MG tablet tablet  Generic drug: apixaban   Take 1 tablet by mouth Every 12 (Twelve) Hours. INSTRUCTED TO STOP 2 DAYS PRIOR TO SURGERY PER DR RICCI'S OFFICE WITH LAST DOSE TO 7-21-23      fluticasone 50 MCG/ACT nasal spray  Commonly known as: FLONASE   1 spray, Nasal, Daily PRN      hydrOXYzine pamoate 50 MG capsule  Commonly known as: VISTARIL   Take 1 capsule by mouth 4 (Four) Times a Day As Needed for Itching, Allergies or Anxiety.      levothyroxine 88 MCG tablet  Commonly known as: SYNTHROID, LEVOTHROID   Take 1 tablet by mouth Daily.      rosuvastatin 10 MG tablet  Commonly known as: CRESTOR   Take 1 tablet by mouth Daily.      SITagliptin 100 MG tablet  Commonly known as: JANUVIA   100 mg, Oral, Daily, INSTRUCTED PER ANESTHESIA ORDERS      traZODone 100 MG tablet  Commonly known as: DESYREL   Take 1 tablet by mouth Every Night.               Allergies   Allergen Reactions    Amoxicillin-Pot Clavulanate Hives    Azithromycin Hives    Erythromycin Hives         Discharge Disposition:    Rehab Facility or Unit (DC - External)    Diet:    Heart healthy    Discharge Activity:     Activity Instructions       Activity as Tolerated              No future appointments.    Additional Instructions for the Follow-ups that You Need to Schedule       Discharge Follow-up with PCP   As directed       Currently Documented PCP:    Tony Nelson MD    PCP Phone Number:    [phone]     Follow Up Details: Post discharge from nursing home                Time  spent on Discharge including face to face service: 33 minutes.            I have dictated this note utilizing Dragon Dictation.             Please note that portions of this note were completed with a voice recognition program.             Part of this note may be an electronic transcription/translation of spoken language to printed text         using the Dragon Dictation System.       Electronically signed by Collins Andrade MD, 04/29/24, 3:40 PM EDT.

## 2024-04-29 NOTE — THERAPY TREATMENT NOTE
Acute Care - Physical Therapy Treatment Note   [facility]     Patient Name: Shani Burnett  : 1932  MRN: [id number]  Today's Date: 2024      Visit Dx:     ICD-10-CM ICD-9-CM   1. Encephalopathy  G93.40 348.30   2. Leukocytosis, unspecified type  D72.829 288.60   3. Bronchiectasis without complication  J47.9 494.0   4. Pneumonia of both lungs due to infectious organism, unspecified part of lung  J18.9 483.8   5. Dehydration  E86.0 276.51   6. Difficulty walking  R26.2 719.7     Patient Active Problem List   Diagnosis    Arthritis    Bunion    Corns and callosity    Diabetes type 2, controlled    DVT (deep venous thrombosis)    Glaucoma    Hypertension    High cholesterol    Stroke    Chronic HFrEF (heart failure with reduced ejection fraction)    Bradycardia    Pacemaker    Pacemaker at end of battery life    PNA (pneumonia)    Acute kidney injury    Altered mental status    Hypokalemia     Past Medical History:   Diagnosis Date    Arthritis     Dementia     Depression     Diabetes mellitus     Disease of thyroid gland     Hypertension      Past Surgical History:   Procedure Laterality Date    BACK SURGERY      HYSTERECTOMY      PACEMAKER REPLACEMENT N/A 2023    Procedure: PPM generator change - dual;  Surgeon: Nic Seo MD;  Location: [facility] INVASIVE LOCATION;  Service: Cardiovascular;  Laterality: N/A;     PT Assessment (Last 12 Hours)       PT Evaluation and Treatment       Row Name 24 1016          Physical Therapy Time and Intention    Subjective Information no complaints  -VK     Document Type therapy note (daily note)  -VK     Mode of Treatment individual therapy;physical therapy  -VK     Patient Effort good  -VK       Row Name 24 1016          General Information    Patient Profile Reviewed yes  -VK     Existing Precautions/Restrictions fall  -VK       Row Name 24 1016          Cognition    Affect/Mental Status (Cognition) confused  -VK     Orientation Status (Cognition)  oriented to;person  -VK     Personal Safety Interventions nonskid shoes/slippers when out of bed;gait belt;fall prevention program maintained  -       Row Name 04/29/24 1016          Bed Mobility    Supine-Sit-Supine [place] (Bed Mobility) moderate assist (50% patient effort);maximum assist (25% patient effort);1 person assist;verbal cues  -VK     Bed Mobility, Safety Issues decreased use of legs for bridging/pushing;impaired trunk control for bed mobility  -VK     Assistive Device (Bed Mobility) bed rails;draw sheet;head of bed elevated  -       Row Name 04/29/24 1016          Transfers    Comment, (Transfers) Pt declines to t/f bed-chair  -       Row Name 04/29/24 1016          Sit-Stand Transfer    Sit-Stand [place] (Transfers) moderate assist (50% patient effort);verbal cues  -VK     Assistive Device (Sit-Stand Transfers) walker, front-wheeled  -       Row Name 04/29/24 1016          Stand-Sit Transfer    Stand-Sit [place] (Transfers) moderate assist (50% patient effort);verbal cues  -VK     Assistive Device (Stand-Sit Transfers) walker, front-wheeled  -       Row Name 04/29/24 1016          Gait/Stairs (Locomotion)    Reason Patient was unable to Ambulate Excessive Weakness  -       Row Name 04/29/24 1016          Safety Issues, Functional Mobility    Impairments Affecting Function (Mobility) balance;endurance/activity tolerance;strength  -[facility] Name 04/29/24 1016          Balance    Balance Assessment sit to stand dynamic balance;standing static balance;sitting static balance  -VK     Static Sitting Balance moderate assist;verbal cues  -VK     Position, Sitting Balance sitting edge of bed  -VK     Static Standing Balance moderate assist;verbal cues  -VK     Position/Device Used, Standing Balance walker, front-wheeled  -VK     Balance Interventions sit to stand;sitting;static  -VK     Comment, Balance Performed sit-stands x3. EOB for 5 minutes, trunk control improved  throughout.  -       Row Name 04/29/24 1016          Motor Skills    Therapeutic Exercise hip;ankle  -       Row Name 04/29/24 1016          Hip (Therapeutic Exercise)    Hip (Therapeutic Exercise) isometric exercises  -     Hip Isometrics (Therapeutic Exercise) gluteal sets;5 repetitions;3 sets  -       Row Name 04/29/24 1016          Ankle (Therapeutic Exercise)    Ankle (Therapeutic Exercise) AROM (active range of motion)  -     Ankle AROM (Therapeutic Exercise) bilateral;dorsiflexion;plantarflexion;15 repititions  -       Row Name             Wound 04/24/24 1615 medial coccyx Pressure Injury    Wound - Properties Group Placement Date: 04/24/24  - Placement Time: 1615  -EH Present on Original Admission: Y  -EH Orientation: medial  -EH Location: coccyx  -EH Primary Wound Type: Pressure inj  -EH    Retired Wound - Properties Group Placement Date: 04/24/24  - Placement Time: 1615  -EH Present on Original Admission: Y  -EH Orientation: medial  -EH Location: coccyx  -EH Primary Wound Type: Pressure inj  -EH    Retired Wound - Properties Group Date first assessed: 04/24/24  - Time first assessed: 1615  -EH Present on Original Admission: Y  -EH Location: coccyx  -EH Primary Wound Type: Pressure inj  -EH      Row Name             Wound 04/24/24 1625 Right anterior greater trochanter    Wound - Properties Group Placement Date: 04/24/24  - Placement Time: 1625  -EH Present on Original Admission: Y  -EH Side: Right  -EH Orientation: anterior  -EH Location: greater trochanter  -EH    Retired Wound - Properties Group Placement Date: 04/24/24  - Placement Time: 1625  -EH Present on Original Admission: Y  -EH Side: Right  -EH Orientation: anterior  -EH Location: greater trochanter  -EH    Retired Wound - Properties Group Date first assessed: 04/24/24  - Time first assessed: 1625  -EH Present on Original Admission: Y  -EH Side: Right  -EH Location: greater trochanter  -EH      Row Name             Wound  04/24/24 1626 Left anterior greater trochanter    Wound - Properties Group Placement Date: 04/24/24  - Placement Time: 1626  -EH Present on Original Admission: Y  -EH Side: Left  -EH Orientation: anterior  -EH Location: greater trochanter  -EH    Retired Wound - Properties Group Placement Date: 04/24/24  - Placement Time: 1626  -EH Present on Original Admission: Y  -EH Side: Left  -EH Orientation: anterior  -EH Location: greater trochanter  -EH    Retired Wound - Properties Group Date first assessed: 04/24/24  - Time first assessed: 1626  -EH Present on Original Admission: Y  -EH Side: Left  -EH Location: greater trochanter  -EH      Row Name             Wound 04/24/24 1626 Left lower sternal    Wound - Properties Group Placement Date: 04/24/24  - Placement Time: 1626  -EH Present on Original Admission: Y  -EH Side: Left  -EH Orientation: lower  -EH Location: sternal  -EH    Retired Wound - Properties Group Placement Date: 04/24/24  - Placement Time: 1626  -EH Present on Original Admission: Y  -EH Side: Left  -EH Orientation: lower  -EH Location: sternal  -EH    Retired Wound - Properties Group Date first assessed: 04/24/24  - Time first assessed: 1626  -EH Present on Original Admission: Y  -EH Side: Left  -EH Location: sternal  -EH      Row Name             Wound 04/24/24 1627 Right lower sternal    Wound - Properties Group Placement Date: 04/24/24  - Placement Time: 1627  -EH Present on Original Admission: Y  -EH Side: Right  -EH Orientation: lower  -EH Location: sternal  -EH    Retired Wound - Properties Group Placement Date: 04/24/24  - Placement Time: 1627  -EH Present on Original Admission: Y  -EH Side: Right  -EH Orientation: lower  -EH Location: sternal  -EH    Retired Wound - Properties Group Date first assessed: 04/24/24  - Time first assessed: 1627  -EH Present on Original Admission: Y  -EH Side: Right  -EH Location: sternal  -EH      Row Name 04/29/24 1016          Positioning and  Restraints    Pre-Treatment Position in bed  -VK     Post Treatment Position bed  -VK     In Bed fowlers;call light within reach;encouraged to call for assist;exit alarm on  -VK       Row Name 04/29/24 1016          Progress Summary (PT)    Progress Toward Functional Goals (PT) progress toward functional goals is fair  -VK               User Key  (r) = Recorded By, (t) = Taken By, (c) = Cosigned By      Initials Name Provider Type    Sudha Murphy, RN Registered Nurse    Elise Mathis PTA Physical Therapist Assistant                      PT Recommendation and Plan     Progress Summary (PT)  Progress Toward Functional Goals (PT): progress toward functional goals is fair   Outcome Measures       Row Name 04/29/24 1300             How much help from another person do you currently need...    Turning from your back to your side while in flat bed without using bedrails? 3  -VK      Moving from lying on back to sitting on the side of a flat bed without bedrails? 3  -VK      Moving to and from a bed to a chair (including a wheelchair)? 2  -VK      Standing up from a chair using your arms (e.g., wheelchair, bedside chair)? 3  -VK      Climbing 3-5 steps with a railing? 1  -VK      To walk in hospital room? 1  -VK      AM-PAC 6 Clicks Score (PT) 13  -VK      Highest Level of Mobility Goal 4 --> Transfer to chair/commode  -VK                User Key  (r) = Recorded By, (t) = Taken By, (c) = Cosigned By      Initials Name Provider Type    Elise Mathis PTA Physical Therapist Assistant                     Time Calculation:    PT Charges       Row Name 04/29/24 1351             Time Calculation    PT Received On 04/29/24  -VK         Timed Charges    49525 - PT Therapeutic Exercise Minutes 8  -VK      91886 - PT Therapeutic Activity Minutes 17  -VK         Total Minutes    Timed Charges Total Minutes 25  -VK       Total Minutes 25  -VK                User Key  (r) = Recorded By, (t) = Taken By, (c) = Cosigned By       Initials Name Provider Type    ROMA Elise Maravilla PTA Physical Therapist Assistant                  Therapy Charges for Today       Code Description Service Date Service Provider Modifiers Qty    78104818052 HC PT THER PROC EA 15 MIN 4/29/2024 Elise Maravilla PTA GP 1    97016672864 HC PT THERAPEUTIC ACT EA 15 MIN 4/29/2024 Elise Maravilla PTA GP 1            PT G-Codes  Outcome Measure Options: AM-PAC 6 Clicks Basic Mobility (PT)  AM-PAC 6 Clicks Score (PT): 13    Elise Maravilla PTA  4/29/2024

## 2024-04-30 NOTE — NURSING NOTE
VSS. Patient is alert to self. No c/o pain. Skin care completed per orders. Family at bedside. Patient discharged to [facility] at 2215.

## 2024-05-21 LAB
QT INTERVAL: 453 MS
QT INTERVAL: 456 MS
QTC INTERVAL: 458 MS
QTC INTERVAL: 462 MS

## 2024-06-24 ENCOUNTER — OFFICE VISIT (OUTPATIENT)
Dept: PODIATRY | Facility: CLINIC | Age: 89
End: 2024-06-24
Payer: MEDICARE

## 2024-06-24 VITALS
OXYGEN SATURATION: 94 % | TEMPERATURE: 97.8 F | BODY MASS INDEX: 18.99 KG/M2 | DIASTOLIC BLOOD PRESSURE: 70 MMHG | WEIGHT: 114 LBS | SYSTOLIC BLOOD PRESSURE: 122 MMHG | HEIGHT: 65 IN | HEART RATE: 59 BPM

## 2024-06-24 DIAGNOSIS — M79.671 FOOT PAIN, BILATERAL: Primary | ICD-10-CM

## 2024-06-24 DIAGNOSIS — M20.12 VALGUS DEFORMITY OF BOTH GREAT TOES: ICD-10-CM

## 2024-06-24 DIAGNOSIS — L60.0 ONYCHOCRYPTOSIS: ICD-10-CM

## 2024-06-24 DIAGNOSIS — M79.672 FOOT PAIN, BILATERAL: Primary | ICD-10-CM

## 2024-06-24 DIAGNOSIS — F03.90 DEMENTIA WITHOUT BEHAVIORAL DISTURBANCE: ICD-10-CM

## 2024-06-24 DIAGNOSIS — R26.2 DIFFICULTY WALKING: ICD-10-CM

## 2024-06-24 DIAGNOSIS — E11.9 DIABETES MELLITUS WITHOUT COMPLICATION: ICD-10-CM

## 2024-06-24 DIAGNOSIS — M20.11 VALGUS DEFORMITY OF BOTH GREAT TOES: ICD-10-CM

## 2024-06-24 DIAGNOSIS — B35.1 ONYCHOMYCOSIS: ICD-10-CM

## 2024-06-24 PROCEDURE — 99203 OFFICE O/P NEW LOW 30 MIN: CPT | Performed by: PODIATRIST

## 2024-06-24 PROCEDURE — G8404 LOW EXTEMITY NEUR EXAM DOCUM: HCPCS | Performed by: PODIATRIST

## 2024-06-24 PROCEDURE — 11721 DEBRIDE NAIL 6 OR MORE: CPT | Performed by: PODIATRIST

## 2024-06-24 PROCEDURE — 1160F RVW MEDS BY RX/DR IN RCRD: CPT | Performed by: PODIATRIST

## 2024-06-24 PROCEDURE — 1159F MED LIST DOCD IN RCRD: CPT | Performed by: PODIATRIST

## 2024-06-24 RX ORDER — CIPROFLOXACIN 500 MG/1
TABLET, FILM COATED ORAL EVERY 12 HOURS SCHEDULED
COMMUNITY
Start: 2024-06-21

## 2024-06-24 RX ORDER — CEPHALEXIN 500 MG/1
TABLET ORAL EVERY 6 HOURS SCHEDULED
COMMUNITY
Start: 2024-02-05

## 2024-06-24 RX ORDER — MIRTAZAPINE 7.5 MG/1
TABLET, FILM COATED ORAL
COMMUNITY

## 2024-06-24 RX ORDER — NYSTATIN 100000 U/G
CREAM TOPICAL EVERY 8 HOURS SCHEDULED
COMMUNITY

## 2024-06-24 NOTE — PROGRESS NOTES
[facility] - PODIATRY    Today's Date: 06/24/24    Patient Name: Shani Burnett  MRN: [id number]  CSN: [id number]  PCP: Tony Nelson MD, Last PCP Visit:  6/21/2024  Referring Provider: Referring, Self    SUBJECTIVE     Chief Complaint   Patient presents with    Left Foot - Establish Care, Nail Problem, Annual Exam, Diabetes    Right Foot - Establish Care, Nail Problem, Annual Exam, Diabetes     HPI: Shani Burnett, a 89 y.o.female, comes to clinic.  Pt presents with her daughter.    New, Established, New Problem:  New  Location:  Toenails  Duration:   Greater than five years  Onset:  Gradual  Nature:  sore with palpation.  Stable, worsening, improving:   worsening  Aggravating factors:  Pain with shoe gear and ambulation.  Previous Treatment: Unable to trim their own toenails.    No other pedal complaints at this time.    Patient states they are unsure of the most recent blood glucose reading.      Patient denies any fevers, chills, nausea, vomiting, shortness of breath, nor any other constitutional signs nor symptoms.       Past Medical History:   Diagnosis Date    Arthritis     Dementia     Depression     Diabetes mellitus     Disease of thyroid gland     Hypertension      Past Surgical History:   Procedure Laterality Date    BACK SURGERY      HYSTERECTOMY      PACEMAKER REPLACEMENT N/A 7/25/2023    Procedure: PPM generator change - dual;  Surgeon: Nic Seo MD;  Location: [facility] INVASIVE LOCATION;  Service: Cardiovascular;  Laterality: N/A;     History reviewed. No pertinent family history.  Social History     Socioeconomic History    Marital status:    Tobacco Use    Smoking status: Never    Smokeless tobacco: Never   Vaping Use    Vaping status: Never Used   Substance and Sexual Activity    Alcohol use: Never    Drug use: Never    Sexual activity: Defer     Allergies   Allergen Reactions    Amoxicillin-Pot Clavulanate Hives    Azithromycin Hives    Erythromycin Hives     Current  Outpatient Medications   Medication Sig Dispense Refill    albuterol sulfate  (90 Base) MCG/ACT inhaler Inhale 2 puffs Every 4 (Four) Hours As Needed for Wheezing or Shortness of Air.      amLODIPine (NORVASC) 5 MG tablet Take 1 tablet by mouth Daily.      busPIRone (BUSPAR) 5 MG tablet Take 1 tablet by mouth 2 (Two) Times a Day.      Eliquis 2.5 MG tablet tablet Take 1 tablet by mouth Every 12 (Twelve) Hours. INSTRUCTED TO STOP 2 DAYS PRIOR TO SURGERY PER DR RICCI'S OFFICE WITH LAST DOSE TO 7-21-23      fluticasone (FLONASE) 50 MCG/ACT nasal spray 1 spray into the nostril(s) as directed by provider Daily As Needed for Rhinitis or Allergies.      hydrOXYzine pamoate (VISTARIL) 50 MG capsule Take 1 capsule by mouth 4 (Four) Times a Day As Needed for Itching, Allergies or Anxiety.      levothyroxine (SYNTHROID, LEVOTHROID) 88 MCG tablet Take 1 tablet by mouth Daily.      mirtazapine (REMERON) 7.5 MG tablet 2 tab(s) orally once a day (at bedtime) for 30 day(s)      nystatin (MYCOSTATIN) 875023 UNIT/GM cream Every 8 (Eight) Hours.      rosuvastatin (CRESTOR) 10 MG tablet Take 1 tablet by mouth Daily.      SITagliptin (JANUVIA) 100 MG tablet Take 1 tablet by mouth Daily. INSTRUCTED PER ANESTHESIA ORDERS      traZODone (DESYREL) 100 MG tablet Take 1 tablet by mouth Every Night.      Cephalexin 500 MG tablet Every 6 (Six) Hours.      ciprofloxacin (Cipro) 500 MG tablet Every 12 (Twelve) Hours.       No current facility-administered medications for this visit.     Review of Systems   Constitutional: Negative.    Skin:         Painful toenails.   All other systems reviewed and are negative.      OBJECTIVE     Vitals:    06/24/24 1443   BP: 122/70   Pulse: 59   Temp: 97.8 °F (36.6 °C)   SpO2: 94%       Patient seen in no apparent distress.      PHYSICAL EXAM:     Foot/Ankle Exam    GENERAL  Diabetic foot exam performed    Appearance:  appears stated age, elderly, chronically ill and frail  Orientation:  AAOx3  Affect:   appropriate  Assistance:  wheelchair  Right shoe gear: casual shoe  Left shoe gear: casual shoe    VASCULAR     Right Foot Vascularity   Dorsalis pedis:  1+  Posterior tibial:  1+  Skin temperature:  cool  Edema grading:  None  CFT:  3  Pedal hair growth:  Absent  Varicosities:  moderate varicosities     Left Foot Vascularity   Dorsalis pedis:  1+  Posterior tibial:  1+  Skin temperature:  cool  Edema grading:  None  CFT:  3  Pedal hair growth:  Absent  Varicosities:  moderate varicosities     NEUROLOGIC     Right Foot Neurologic   Normal sensation    Light touch sensation: normal  Vibratory sensation: normal  Hot/Cold sensation: normal  Protective Sensation using Hewitt-Bakari Monofilament:   Sites intact: 10  Sites tested: 10     Left Foot Neurologic   Normal sensation    Light touch sensation: normal  Vibratory sensation: normal  Hot/Cold sensation:  normal  Protective Sensation using Hewitt-Bakari Monofilament:   Sites intact: 10  Sites tested: 10    MUSCULOSKELETAL     Right Foot Musculoskeletal   Hallux valgus: Yes       Left Foot Musculoskeletal   Hallux valgus: Yes      MUSCLE STRENGTH     Right Foot Muscle Strength   Foot dorsiflexion:  3  Foot plantar flexion:  3  Foot inversion:  3  Foot eversion:  3     Left Foot Muscle Strength   Foot dorsiflexion:  3  Foot plantar flexion:  3  Foot inversion:  3  Foot eversion:  3    RANGE OF MOTION     Right Foot Range of Motion   Foot and ankle ROM within normal limits       Left Foot Range of Motion   Foot and ankle ROM within normal limits      DERMATOLOGIC      Right Foot Dermatologic   Skin  Right foot skin is intact.   Nails  1.  Positive for elongated, onychomycosis, abnormal thickness, subungual debris and ingrown toenail.  2.  Positive for elongated, onychomycosis, abnormal thickness, subungual debris and ingrown toenail.  3.  Positive for elongated, onychomycosis, abnormal thickness, subungual debris and ingrown toenail.  4.  Positive for elongated,  onychomycosis, abnormal thickness, subungual debris and ingrown toenail.  5.  Positive for elongated, onychomycosis, abnormal thickness, subungual debris and ingrown toenail.  Nails comment:  Toenails 1, 2, 3, 4, and 5     Left Foot Dermatologic   Skin  Left foot skin is intact.   Nails comment:  Toenails 1, 2, 3, 4, and 5  Nails  1.  Positive for elongated, onychomycosis, abnormal thickness, subungual debris and ingrown toenail.  2.  Positive for elongated, onychomycosis, abnormal thickness, subungual debris and ingrown toenail.  3.  Positive for elongated, onychomycosis, abnormal thickness, subungual debris and ingrown toenail.  4.  Positive for elongated, onychomycosis, abnormally thick, subungual debris and ingrown toenail.  5.  Positive for elongated, onychomycosis, abnormally thick, subungual debris and ingrown toenail.    Diabetic Foot Exam Performed and Monofilament Test Performed      ASSESSMENT/PLAN     Diagnoses and all orders for this visit:    1. Foot pain, bilateral (Primary)    2. Onychomycosis    3. Onychocryptosis    4. 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 Dementia without behavioral disturbance    7. Diabetes mellitus without complication    Comprehensive lower extremity examination and evaluation was performed.    Discussed findings and treatment plan including risks, benefits, and treatment options with patient in detail. Patient agreed with treatment plan.    Toenails 1, 2, 3, 4, 5 on Right and 1, 2, 3, 4, 5 on Left were debrided with nail nippers then filed with a Alexismel nail jocelyn.  Patient tolerated procedure well without complications.    Diabetic foot exam performed and documented this date, compliant with CQM required standards. Detail of findings as noted in physical exam.  Lower extremity Neurologic exam for diabetic patient performed and documented this date, compliant with PQRS required standards. Detail of findings as noted in physical exam.  Advised  patient importance of good routine lower extremity hygiene. Advised patient importance of evaluating for intact skin and pain free nail borders.  Advised patient to use mirror to evaluate plantar/ soles of feet for better visualization. Advised patient monitor and phone office to be seen if any cracking to skin, open lesions, painful nail borders or if nails become elongated prior to next visit. Advised patient importance of daily cleansing of lower extremities, followed by good skin cream to maintain normal hydration of skin. Also advised patient importance of close daily monitoring of blood sugar. Advised to regulate diet and medications to maintain control of blood sugar in optimal range. Contact primary care provider if difficulties maintaining blood sugar levels.  Advised Patient of presence of Diabetes Mellitus condition.  Advised Patient risk of progression and worsening or improvement, then return of condition.  Will monitor condition for any change in future. Treat with most appropriate treatment pending status of condition.  Counseled and advised patient extensively on nature and ramifications of diabetes. Standard instructions given to patient for good diabetic foot care and maintenance. Advised importance of careful monitoring to avoid break down and complications secondary to diabetes. Advised patient importance of strict maintenance of blood sugar control. Advised patient of possible ominous results from neglect of condition, i.e.: amputation/ loss of digits, feet and legs, or even death.  Patient states understands counseling, will monitor closely, continue good hygiene and routine diabetic foot care. Patient will contact office is questions or problems.      Patient is to monitor for recurrence and any new symptoms and to contact Dr. Rowland's office for a follow-up appointment.      The patient states understanding and agreement with this plan.    An After Visit Summary was printed and given to the  patient at discharge, including (if requested) any available informative/educational handouts regarding diagnosis, treatment, or medications. All questions were answered to patient/family satisfaction. Should symptoms fail to improve or worsen they agree to call or return to clinic or to go to the Emergency Department. Discussed the importance of following up with any needed screening tests/labs/specialist appointments and any requested follow-up recommended by me today. Importance of maintaining follow-up discussed and patient accepts that missed appointments can delay diagnosis and potentially lead to worsening of conditions.    Return in about 9 weeks (around 8/26/2024) for Toenail Care., or sooner if acute issues arise.    This document has been electronically signed by Darian Rowland DPM on June 24, 2024 15:10 EDT

## 2024-08-20 ENCOUNTER — LAB REQUISITION (OUTPATIENT)
Dept: LAB | Facility: HOSPITAL | Age: 89
End: 2024-08-20
Payer: MEDICARE

## 2024-08-20 DIAGNOSIS — N39.0 URINARY TRACT INFECTION, SITE NOT SPECIFIED: ICD-10-CM

## 2024-08-20 LAB
BACTERIA UR QL AUTO: ABNORMAL /HPF
BILIRUB UR QL STRIP: NEGATIVE
CLARITY UR: ABNORMAL
COLOR UR: YELLOW
GLUCOSE UR STRIP-MCNC: NEGATIVE MG/DL
HGB UR QL STRIP.AUTO: NEGATIVE
HYALINE CASTS UR QL AUTO: ABNORMAL /LPF
KETONES UR QL STRIP: NEGATIVE
LEUKOCYTE ESTERASE UR QL STRIP.AUTO: ABNORMAL
NITRITE UR QL STRIP: NEGATIVE
PH UR STRIP.AUTO: 7 [PH] (ref 5–8)
PROT UR QL STRIP: NEGATIVE
RBC # UR STRIP: ABNORMAL /HPF
REF LAB TEST METHOD: ABNORMAL
SP GR UR STRIP: 1.01 (ref 1–1.03)
SQUAMOUS #/AREA URNS HPF: ABNORMAL /HPF
UROBILINOGEN UR QL STRIP: ABNORMAL
WBC # UR STRIP: ABNORMAL /HPF

## 2024-08-20 PROCEDURE — 81001 URINALYSIS AUTO W/SCOPE: CPT | Performed by: INTERNAL MEDICINE

## 2024-08-20 PROCEDURE — 87088 URINE BACTERIA CULTURE: CPT | Performed by: INTERNAL MEDICINE

## 2024-08-20 PROCEDURE — 87086 URINE CULTURE/COLONY COUNT: CPT | Performed by: INTERNAL MEDICINE

## 2024-08-20 PROCEDURE — 87186 SC STD MICRODIL/AGAR DIL: CPT | Performed by: INTERNAL MEDICINE

## 2024-08-22 LAB — BACTERIA SPEC AEROBE CULT: ABNORMAL

## 2024-09-10 ENCOUNTER — LAB REQUISITION (OUTPATIENT)
Dept: LAB | Facility: HOSPITAL | Age: 89
End: 2024-09-10
Payer: MEDICARE

## 2024-09-10 DIAGNOSIS — R41.0 DISORIENTATION, UNSPECIFIED: ICD-10-CM

## 2024-09-10 DIAGNOSIS — R53.1 WEAKNESS: ICD-10-CM

## 2024-09-10 DIAGNOSIS — N39.0 URINARY TRACT INFECTION, SITE NOT SPECIFIED: ICD-10-CM

## 2024-09-10 LAB
BACTERIA UR QL AUTO: ABNORMAL /HPF
BILIRUB UR QL STRIP: NEGATIVE
CLARITY UR: CLEAR
COLOR UR: YELLOW
GLUCOSE UR STRIP-MCNC: NEGATIVE MG/DL
HGB UR QL STRIP.AUTO: NEGATIVE
HYALINE CASTS UR QL AUTO: ABNORMAL /LPF
KETONES UR QL STRIP: NEGATIVE
LEUKOCYTE ESTERASE UR QL STRIP.AUTO: ABNORMAL
NITRITE UR QL STRIP: NEGATIVE
PH UR STRIP.AUTO: 6.5 [PH] (ref 5–8)
PROT UR QL STRIP: NEGATIVE
RBC # UR STRIP: ABNORMAL /HPF
REF LAB TEST METHOD: ABNORMAL
SP GR UR STRIP: 1.01 (ref 1–1.03)
SQUAMOUS #/AREA URNS HPF: ABNORMAL /HPF
UROBILINOGEN UR QL STRIP: ABNORMAL
WBC # UR STRIP: ABNORMAL /HPF

## 2024-09-10 PROCEDURE — 87088 URINE BACTERIA CULTURE: CPT | Performed by: INTERNAL MEDICINE

## 2024-09-10 PROCEDURE — 87186 SC STD MICRODIL/AGAR DIL: CPT | Performed by: INTERNAL MEDICINE

## 2024-09-10 PROCEDURE — 87086 URINE CULTURE/COLONY COUNT: CPT | Performed by: INTERNAL MEDICINE

## 2024-09-10 PROCEDURE — 81001 URINALYSIS AUTO W/SCOPE: CPT | Performed by: INTERNAL MEDICINE

## 2024-09-12 LAB — BACTERIA SPEC AEROBE CULT: ABNORMAL

## 2024-10-09 ENCOUNTER — LAB REQUISITION (OUTPATIENT)
Dept: LAB | Facility: HOSPITAL | Age: 89
End: 2024-10-09
Payer: MEDICARE

## 2024-10-09 DIAGNOSIS — N39.0 URINARY TRACT INFECTION, SITE NOT SPECIFIED: ICD-10-CM

## 2024-10-09 LAB
BACTERIA UR QL AUTO: ABNORMAL /HPF
BILIRUB UR QL STRIP: NEGATIVE
CLARITY UR: ABNORMAL
COD CRY URNS QL: ABNORMAL /HPF
COLOR UR: YELLOW
GLUCOSE UR STRIP-MCNC: NEGATIVE MG/DL
HGB UR QL STRIP.AUTO: NEGATIVE
HYALINE CASTS UR QL AUTO: ABNORMAL /LPF
KETONES UR QL STRIP: NEGATIVE
LEUKOCYTE ESTERASE UR QL STRIP.AUTO: ABNORMAL
NITRITE UR QL STRIP: NEGATIVE
PH UR STRIP.AUTO: 6 [PH] (ref 5–8)
PROT UR QL STRIP: NEGATIVE
RBC # UR STRIP: ABNORMAL /HPF
REF LAB TEST METHOD: ABNORMAL
SP GR UR STRIP: 1.02 (ref 1–1.03)
SQUAMOUS #/AREA URNS HPF: ABNORMAL /HPF
UROBILINOGEN UR QL STRIP: ABNORMAL
WBC # UR STRIP: ABNORMAL /HPF

## 2024-10-09 PROCEDURE — 87086 URINE CULTURE/COLONY COUNT: CPT | Performed by: INTERNAL MEDICINE

## 2024-10-09 PROCEDURE — 87088 URINE BACTERIA CULTURE: CPT | Performed by: INTERNAL MEDICINE

## 2024-10-09 PROCEDURE — 81001 URINALYSIS AUTO W/SCOPE: CPT | Performed by: INTERNAL MEDICINE

## 2024-10-09 PROCEDURE — 87186 SC STD MICRODIL/AGAR DIL: CPT | Performed by: INTERNAL MEDICINE

## 2024-10-11 LAB — BACTERIA SPEC AEROBE CULT: ABNORMAL

## (undated) DEVICE — PACEMAKER PACK: Brand: MEDLINE INDUSTRIES, INC.

## (undated) DEVICE — DRSNG SURG AQUACEL AG/ADVNTGE 9X15CM 3.5X6IN

## (undated) DEVICE — PLASMABLADE PS200-040 4.0: Brand: PLASMABLADE™

## (undated) DEVICE — PENCL E/S SMOKEEVAC W/TELESCP CANN

## (undated) DEVICE — 3M™ STERI-STRIP™ REINFORCED ADHESIVE SKIN CLOSURES, R1546, 1/4 IN X 4 IN (6 MM X 100 MM), 10 STRIPS/ENVELOPE: Brand: 3M™ STERI-STRIP™

## (undated) DEVICE — TP SXN YANKR BULB STRL

## (undated) DEVICE — 8 FOOT DISPOSABLE EXTENSION CABLE WITH SAFE CONNECT / ALLIGATOR CLIP

## (undated) DEVICE — TUBING, SUCTION, 1/4" X 12', STRAIGHT: Brand: MEDLINE